# Patient Record
Sex: FEMALE | Race: BLACK OR AFRICAN AMERICAN | Employment: UNEMPLOYED | URBAN - METROPOLITAN AREA
[De-identification: names, ages, dates, MRNs, and addresses within clinical notes are randomized per-mention and may not be internally consistent; named-entity substitution may affect disease eponyms.]

---

## 2017-10-11 ENCOUNTER — HOSPITAL ENCOUNTER (EMERGENCY)
Age: 52
Discharge: HOME | End: 2017-10-11
Attending: EMERGENCY MEDICINE
Payer: SELF-PAY

## 2017-10-11 VITALS
RESPIRATION RATE: 16 BRPM | DIASTOLIC BLOOD PRESSURE: 78 MMHG | HEIGHT: 72 IN | HEART RATE: 70 BPM | OXYGEN SATURATION: 95 % | TEMPERATURE: 98.5 F | SYSTOLIC BLOOD PRESSURE: 142 MMHG

## 2017-10-11 DIAGNOSIS — F41.1 ANXIETY STATE: ICD-10-CM

## 2017-10-11 DIAGNOSIS — R42 DIZZINESS: Primary | ICD-10-CM

## 2017-10-11 DIAGNOSIS — Z91.14 NONCOMPLIANCE WITH MEDICATION TREATMENT DUE TO INTERMITTENT USE OF MEDICATION: ICD-10-CM

## 2017-10-11 PROCEDURE — 99284 EMERGENCY DEPT VISIT MOD MDM: CPT

## 2017-10-11 NOTE — DISCHARGE INSTRUCTIONS

## 2017-10-11 NOTE — ED TRIAGE NOTES
Pt arrived via EMS with c/o dizzy and off the balance but never fell or passed out. pt sts\"I want to check out,I am stressed,I looking for disability as I have bipolar,I feel I am in menopause  its make my  Face swelling. \"As per EMS report pt non-compliance with her psyche meds. Pt alert oriented,answered all questions correctly.

## 2017-10-11 NOTE — ED PROVIDER NOTES
[address] [facility]  EMERGENCY DEPARTMENT HISTORY AND PHYSICAL EXAM         Date of Service: 10/11/2017   Patient Name: Radha Ma   YOB: 1965  Medical Record Number: [id number]    History of Presenting Illness     Chief Complaint   Patient presents with    Dizziness    Anxiety        History Provided By:  patient    Additional History:   Radha Ma is a 50 y.o. female with PMhx significant for hypertension, anxiety, and bipolar disorder who presents via EMS to the ED with cc of intermittent dizziness over the past couple days. Pt states the dizziness has subsided at present. She reports coming to the ED 5 months ago for similar dizziness and had a negative workup at that time. Pt adds that she is non-compliant with her Lithium medication and has not taken it in a week. Pt also notes ongoing anxiety related to menopause. She denies any chest pain, shortness of breath, weakness, numbness. Social Hx: + Tobacco, - EtOH, - Illicit Drugs    There are no other complaints, changes or physical findings at this time. Primary Care Provider: None     Past History     Past Medical History:   Past Medical History:   Diagnosis Date    Psychiatric disorder     anxiety,bipolar        Past Surgical History:   History reviewed. No pertinent surgical history. Family History:   History reviewed. No pertinent family history. Social History:   Social History   Substance Use Topics    Smoking status: Heavy Tobacco Smoker    Smokeless tobacco: Former User    Alcohol use No        Allergies:   No Known Allergies     Review of Systems   Review of Systems   Constitutional: Negative for chills and fever. HENT: Negative for congestion, rhinorrhea, sneezing and sore throat. Eyes: Negative for redness and visual disturbance. Respiratory: Negative for shortness of breath. Cardiovascular: Negative for chest pain and leg swelling.    Gastrointestinal: Negative for abdominal pain, nausea and vomiting. Genitourinary: Negative for difficulty urinating and frequency. Musculoskeletal: Negative for back pain, myalgias and neck pain. Skin: Negative for rash. Neurological: Positive for dizziness. Negative for syncope, weakness and headaches. Hematological: Negative for adenopathy. Psychiatric/Behavioral: The patient is nervous/anxious. All other systems reviewed and are negative. Physical Exam  Physical Exam   Constitutional: She is oriented to person, place, and time. She appears well-developed and well-nourished. HENT:   Head: Normocephalic. Mouth/Throat: Oropharynx is clear and moist.   Eyes: Conjunctivae and EOM are normal. Pupils are equal, round, and reactive to light. Neck: Normal range of motion. Neck supple. Cardiovascular: Normal rate, regular rhythm, normal heart sounds and intact distal pulses. Pulmonary/Chest: Effort normal and breath sounds normal.   Abdominal: Soft. Bowel sounds are normal. She exhibits no distension. There is no rebound. Musculoskeletal: Normal range of motion. She exhibits no edema or deformity. Neurological: She is alert and oriented to person, place, and time. Skin: Skin is warm and dry. Psychiatric: She has a normal mood and affect. Her behavior is normal. Judgment and thought content normal.   Nursing note and vitals reviewed. Medical Decision Making   I am the first provider for this patient. I reviewed the vital signs, available nursing notes, past medical history, past surgical history, family history and social history. Provider Notes: DDx: anxiety, vertigo, transient hypotension     ED Course:  2:56 PM   Initial assessment performed. The patients presenting problems have been discussed, and they are in agreement with the care plan formulated and outlined with them. I have encouraged them to ask questions as they arise throughout their visit.       Diagnostic Study Results     Vital Signs-Reviewed the patient's vital signs. Patient Vitals for the past 12 hrs:   Temp Pulse Resp BP SpO2   10/11/17 1455 98.5 °F (36.9 °C) 70 16 142/78 95 %       Medications Given in the ED:  Medications - No data to display    Diagnosis:  Clinical Impression:   1. Dizziness    2. Anxiety state    3. Noncompliance with medication treatment due to intermittent use of medication         Plan:  1:   Follow-up Information     Follow up With Details Comments Contact Info    None   None ([phone]) Patient stated that they have no PCP      [facility] EMERGENCY DEPT  As needed, If symptoms worsen [address] [facility]  [phone]          2: There are no discharge medications for this patient. Return to ED if worse. Disposition:    DISCHARGE NOTE  3:09 PM  The patient has been re-evaluated and is ready for discharge. Reviewed available results with patient. Counseled pt on diagnosis and care plan. Pt has expressed understanding, and all questions have been answered. Pt agrees with plan and agrees to follow up as recommended, or return to the ED if their symptoms worsen. Discharge instructions have been provided and explained to the pt, along with reasons to return to the ED. Attestations: This note is prepared by Mitzy Colmenares. Shlomo Momin, acting as Scribe for Rafael Valle MD.    Rafael Valle MD: The scribe's documentation has been prepared under my direction and personally reviewed by me in its entirety. I confirm that the note above accurately reflects all work, treatment, procedures, and medical decision making performed by me.

## 2017-10-11 NOTE — ED NOTES
Emergency Department Nursing Plan of Care       The Nursing Plan of Care is developed from the Nursing assessment and Emergency Department Attending provider initial evaluation. The plan of care may be reviewed in the ED Provider note.     The Plan of Care was developed with the following considerations:   Patient / Family readiness to learn indicated by:verbalized understanding  Persons(s) to be included in education: patient  Barriers to Learning/Limitations:No    Signed     Tree Markham RN    10/11/2017   3:12 PM

## 2018-01-15 ENCOUNTER — HOSPITAL ENCOUNTER (EMERGENCY)
Age: 53
Discharge: HOME | End: 2018-01-15
Attending: EMERGENCY MEDICINE | Admitting: EMERGENCY MEDICINE
Payer: SUBSIDIZED

## 2018-01-15 VITALS
SYSTOLIC BLOOD PRESSURE: 132 MMHG | OXYGEN SATURATION: 100 % | BODY MASS INDEX: 27.09 KG/M2 | WEIGHT: 200 LBS | RESPIRATION RATE: 19 BRPM | HEART RATE: 80 BPM | DIASTOLIC BLOOD PRESSURE: 67 MMHG | HEIGHT: 72 IN | TEMPERATURE: 98.6 F

## 2018-01-15 DIAGNOSIS — F31.60 BIPOLAR AFFECTIVE DISORDER, CURRENT EPISODE MIXED, CURRENT EPISODE SEVERITY UNSPECIFIED (HCC): ICD-10-CM

## 2018-01-15 DIAGNOSIS — I10 ESSENTIAL HYPERTENSION: Primary | ICD-10-CM

## 2018-01-15 DIAGNOSIS — R09.89 THROAT TIGHTNESS: ICD-10-CM

## 2018-01-15 DIAGNOSIS — Z91.14 NONCOMPLIANCE WITH MEDICATIONS: ICD-10-CM

## 2018-01-15 PROCEDURE — 90791 PSYCH DIAGNOSTIC EVALUATION: CPT

## 2018-01-15 PROCEDURE — 99284 EMERGENCY DEPT VISIT MOD MDM: CPT

## 2018-01-15 PROCEDURE — 74011250637 HC RX REV CODE- 250/637: Performed by: EMERGENCY MEDICINE

## 2018-01-15 RX ORDER — LISINOPRIL 5 MG/1
10 TABLET ORAL
Status: COMPLETED | OUTPATIENT
Start: 2018-01-15 | End: 2018-01-15

## 2018-01-15 RX ADMIN — LISINOPRIL 10 MG: 5 TABLET ORAL at 09:23

## 2018-01-15 NOTE — BSMART NOTE
Discussed patient with Dr. Shira Hightower. Patient is not suicidal or homicidal and he does not feel that patient would be admitted. Called [facility] and [facility] but patient is not open. [facility] is currently closed due to the holiday but patient reports this is her primary care and where she gets her medication. Dr. Shira Hightower does not feel patient needs to be seen at this time and he will refer patient back to [facility] and will also inform patient of walk-in services at [facility] and [facility].     Julianne Ibarra [facility]
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
## 2018-01-15 NOTE — DISCHARGE INSTRUCTIONS
Bipolar Disorder: Care Instructions  Your Care Instructions    Bipolar disorder is an illness that causes extreme mood changes, from times of very high energy (manic episodes) to times of depression. But many people with bipolar disorder show only the symptoms of depression. These moods may cause problems with your work, school, family life, friendships, and how well you function. This disease is also called manic-depression. There is no cure for bipolar disorder, but it can be helped with medicines. Counseling may also help. It is important to take your medicines exactly as prescribed, even when you feel well. You may need lifelong treatment. Follow-up care is a key part of your treatment and safety. Be sure to make and go to all appointments, and call your doctor if you are having problems. It's also a good idea to know your test results and keep a list of the medicines you take. How can you care for yourself at home? · Be safe with medicines. Take your medicines exactly as prescribed. Do not stop or change a medicine without talking to your doctor first. Tabitha Paige and your doctor may need to try different combinations of medicines to find what works for you. · Take your medicines on schedule to keep your moods even. When you feel good, you may think that you do not need your medicines. But it is important to keep taking them. · Go to your counseling sessions. Call and talk with your counselor if you can't go to a session or if you don't think the sessions are helping. Do not just stop going. · Get at least 30 minutes of activity on most days of the week. Walking is a good choice. You also may want to do other things, such as running, swimming, or cycling. · Get enough sleep. Keep your room dark and quiet. Try to go to bed at the same time every night. · Eat a healthy diet. This includes whole grains, dairy, fruits, vegetables, and protein. Eat foods from each of these groups. · Try to lower your stress. Manage your time, build a strong support system, and lead a healthy lifestyle. To lower your stress, try physical activity, slow deep breathing, or getting a massage. · Do not use alcohol or illegal drugs. · Learn the early signs of your mood changes. You can then take steps to help yourself feel better. · Ask for help from friends and family when you need it. You may need help with daily chores when you are depressed. When you are manic, you may need support to control your high energy levels. What should you do if someone in your family has bipolar disorder? · Learn about the disease and the signs that it is getting worse. · Remind your family member that you love him or her. · Make a plan with all family members about how to take care of your loved one when his or her symptoms are bad. · Talk about your fears and concerns and those of other family members. Seek counseling if needed. · Do not focus attention only on the person who is in treatment. · Remind yourself that it will take time for changes to occur. · Do not blame yourself for the disease. · Know your legal rights and the legal rights of your family member. Support groups or counselors can help you with this information. · Take care of yourself. Keep up with your own interests, such as your career, hobbies, and friends. Use exercise, positive self-talk, deep breathing, and other relaxing exercises to help lower your stress. · Give yourself time to grieve. You may need to deal with emotions such as anger, fear, and frustration. After you work through your feelings, you will be better able to care for yourself and your family. · If you are having a hard time with your feelings or with your relationship with your family member, talk with a counselor. When should you call for help? Call 911 anytime you think you may need emergency care. For example, call if:  ? · You feel like hurting yourself or someone else.    ? · Someone who has bipolar disorder displays dangerous behavior, and you think the person might hurt himself or herself or someone else. ?Call your doctor now or seek immediate medical care if:  ? · You hear voices. ? · Someone you know has bipolar disorder and talks about suicide. Keep the numbers for these national suicide hotlines: 0-701-411-TALK (2-519-562-007-705-6115) and 6-800-WBNKFCF (0-235.834.8979). If a suicide threat seems real, with a specific plan and a way to carry it out, stay with the person, or ask someone you trust to stay with the person, until you can get help. ? · Someone you know has bipolar disorder and:  ¨ Starts to give away possessions. ¨ Is using illegal drugs or drinking alcohol heavily. ¨ Talks or writes about death, including writing suicide notes or talking about guns, knives, or pills. ¨ Talks or writes about hurting someone else. ¨ Starts to spend a lot of time alone. ¨ Acts very aggressively or suddenly appears calm. ¨ Talks about beliefs that are not based in reality (delusions). ? Watch closely for changes in your health, and be sure to contact your doctor if:  ? · You cannot go to your counseling sessions. Where can you learn more? Go to http://ernie-nona.info/. Enter K052 in the search box to learn more about \"Bipolar Disorder: Care Instructions. \"  Current as of: May 12, 2017  Content Version: 11.4  © 1029-8461 [employer]. Care instructions adapted under license by [employer] (which disclaims liability or warranty for this information). If you have questions about a medical condition or this instruction, always ask your healthcare professional. [place] [address] any warranty or liability for your use of this information. High Blood Pressure: Care Instructions  Your Care Instructions    If your blood pressure is usually above 140/90, you have high blood pressure, or hypertension.  That means the top number is 140 or higher or the bottom number is 90 or higher, or both. Despite what a lot of people think, high blood pressure usually doesn't cause headaches or make you feel dizzy or lightheaded. It usually has no symptoms. But it does increase your risk for heart attack, stroke, and kidney or eye damage. The higher your blood pressure, the more your risk increases. Your doctor will give you a goal for your blood pressure. Your goal will be based on your health and your age. An example of a goal is to keep your blood pressure below 140/90. Lifestyle changes, such as eating healthy and being active, are always important to help lower blood pressure. You might also take medicine to reach your blood pressure goal.  Follow-up care is a key part of your treatment and safety. Be sure to make and go to all appointments, and call your doctor if you are having problems. It's also a good idea to know your test results and keep a list of the medicines you take. How can you care for yourself at home? Medical treatment  · If you stop taking your medicine, your blood pressure will go back up. You may take one or more types of medicine to lower your blood pressure. Be safe with medicines. Take your medicine exactly as prescribed. Call your doctor if you think you are having a problem with your medicine. · Talk to your doctor before you start taking aspirin every day. Aspirin can help certain people lower their risk of a heart attack or stroke. But taking aspirin isn't right for everyone, because it can cause serious bleeding. · See your doctor regularly. You may need to see the doctor more often at first or until your blood pressure comes down. · If you are taking blood pressure medicine, talk to your doctor before you take decongestants or anti-inflammatory medicine, such as ibuprofen. Some of these medicines can raise blood pressure. · Learn how to check your blood pressure at home. Lifestyle changes  · Stay at a healthy weight.  This is especially important if you put on weight around the waist. Losing even 10 pounds can help you lower your blood pressure. · If your doctor recommends it, get more exercise. Walking is a good choice. Bit by bit, increase the amount you walk every day. Try for at least 30 minutes on most days of the week. You also may want to swim, bike, or do other activities. · Avoid or limit alcohol. Talk to your doctor about whether you can drink any alcohol. · Try to limit how much sodium you eat to less than 2,300 milligrams (mg) a day. Your doctor may ask you to try to eat less than 1,500 mg a day. · Eat plenty of fruits (such as bananas and oranges), vegetables, legumes, whole grains, and low-fat dairy products. · Lower the amount of saturated fat in your diet. Saturated fat is found in animal products such as milk, cheese, and meat. Limiting these foods may help you lose weight and also lower your risk for heart disease. · Do not smoke. Smoking increases your risk for heart attack and stroke. If you need help quitting, talk to your doctor about stop-smoking programs and medicines. These can increase your chances of quitting for good. When should you call for help? Call 911 anytime you think you may need emergency care. This may mean having symptoms that suggest that your blood pressure is causing a serious heart or blood vessel problem. Your blood pressure may be over 180/110. ? For example, call 911 if:  ? · You have symptoms of a heart attack. These may include:  ¨ Chest pain or pressure, or a strange feeling in the chest.  ¨ Sweating. ¨ Shortness of breath. ¨ Nausea or vomiting. ¨ Pain, pressure, or a strange feeling in the back, neck, jaw, or upper belly or in one or both shoulders or arms. ¨ Lightheadedness or sudden weakness. ¨ A fast or irregular heartbeat. ? · You have symptoms of a stroke.  These may include:  ¨ Sudden numbness, tingling, weakness, or loss of movement in your face, arm, or leg, especially on only one side of your body. ¨ Sudden vision changes. ¨ Sudden trouble speaking. ¨ Sudden confusion or trouble understanding simple statements. ¨ Sudden problems with walking or balance. ¨ A sudden, severe headache that is different from past headaches. ? · You have severe back or belly pain. ?Do not wait until your blood pressure comes down on its own. Get help right away. ?Call your doctor now or seek immediate care if:  ? · Your blood pressure is much higher than normal (such as 180/110 or higher), but you don't have symptoms. ? · You think high blood pressure is causing symptoms, such as:  ¨ Severe headache. ¨ Blurry vision. ? Watch closely for changes in your health, and be sure to contact your doctor if:  ? · Your blood pressure measures 140/90 or higher at least 2 times. That means the top number is 140 or higher or the bottom number is 90 or higher, or both. ? · You think you may be having side effects from your blood pressure medicine. ? · Your blood pressure is usually normal, but it goes above normal at least 2 times. Where can you learn more? Go to http://ernie-nona.info/. Enter P338 in the search box to learn more about \"High Blood Pressure: Care Instructions. \"  Current as of: September 21, 2016  Content Version: 11.4  © 4914-1157 [employer]. Care instructions adapted under license by [employer] (which disclaims liability or warranty for this information). If you have questions about a medical condition or this instruction, always ask your healthcare professional. [place] [address] any warranty or liability for your use of this information.

## 2018-01-17 ENCOUNTER — HOSPITAL ENCOUNTER (INPATIENT)
Age: 53
LOS: 12 days | Discharge: HOME | DRG: 885 | End: 2018-01-29
Attending: EMERGENCY MEDICINE | Admitting: PSYCHIATRY & NEUROLOGY
Payer: SUBSIDIZED

## 2018-01-17 DIAGNOSIS — F31.9 BIPOLAR 1 DISORDER (HCC): Primary | ICD-10-CM

## 2018-01-17 LAB
ALBUMIN SERPL-MCNC: 3.7 G/DL (ref 3.5–5)
ALBUMIN/GLOB SERPL: 0.8 {RATIO} (ref 1.1–2.2)
ALP SERPL-CCNC: 83 U/L (ref 45–117)
ALT SERPL-CCNC: 42 U/L (ref 12–78)
AMPHET UR QL SCN: NEGATIVE
ANION GAP SERPL CALC-SCNC: 9 MMOL/L (ref 5–15)
APPEARANCE UR: ABNORMAL
AST SERPL-CCNC: 125 U/L (ref 15–37)
BACTERIA URNS QL MICRO: NEGATIVE /HPF
BARBITURATES UR QL SCN: NEGATIVE
BASOPHILS # BLD: 0 K/UL (ref 0–0.1)
BASOPHILS NFR BLD: 0 % (ref 0–1)
BENZODIAZ UR QL: NEGATIVE
BILIRUB SERPL-MCNC: 0.6 MG/DL (ref 0.2–1)
BILIRUB UR QL CFM: NEGATIVE
BUN SERPL-MCNC: 43 MG/DL (ref 6–20)
BUN/CREAT SERPL: 32 (ref 12–20)
CALCIUM SERPL-MCNC: 9.1 MG/DL (ref 8.5–10.1)
CANNABINOIDS UR QL SCN: NEGATIVE
CHLORIDE SERPL-SCNC: 100 MMOL/L (ref 97–108)
CO2 SERPL-SCNC: 26 MMOL/L (ref 21–32)
COCAINE UR QL SCN: NEGATIVE
COLOR UR: ABNORMAL
CREAT SERPL-MCNC: 1.33 MG/DL (ref 0.55–1.02)
DATE LAST DOSE: ABNORMAL
DRUG SCRN COMMENT,DRGCM: NORMAL
EOSINOPHIL # BLD: 0 K/UL (ref 0–0.4)
EOSINOPHIL NFR BLD: 0 % (ref 0–7)
EPITH CASTS URNS QL MICRO: ABNORMAL /LPF
ERYTHROCYTE [DISTWIDTH] IN BLOOD BY AUTOMATED COUNT: 13.8 % (ref 11.5–14.5)
ETHANOL SERPL-MCNC: <10 MG/DL
GLOBULIN SER CALC-MCNC: 4.5 G/DL (ref 2–4)
GLUCOSE SERPL-MCNC: 96 MG/DL (ref 65–100)
GLUCOSE UR STRIP.AUTO-MCNC: NEGATIVE MG/DL
HCG UR QL: NEGATIVE
HCT VFR BLD AUTO: 34.7 % (ref 35–47)
HGB BLD-MCNC: 11.3 G/DL (ref 11.5–16)
HGB UR QL STRIP: ABNORMAL
HYALINE CASTS URNS QL MICRO: ABNORMAL /LPF (ref 0–5)
KETONES UR QL STRIP.AUTO: ABNORMAL MG/DL
LEUKOCYTE ESTERASE UR QL STRIP.AUTO: ABNORMAL
LITHIUM SERPL-SCNC: <0.2 MMOL/L (ref 0.6–1.2)
LYMPHOCYTES # BLD: 1.7 K/UL (ref 0.8–3.5)
LYMPHOCYTES NFR BLD: 21 % (ref 12–49)
MCH RBC QN AUTO: 27.8 PG (ref 26–34)
MCHC RBC AUTO-ENTMCNC: 32.6 G/DL (ref 30–36.5)
MCV RBC AUTO: 85.5 FL (ref 80–99)
METHADONE UR QL: NEGATIVE
MONOCYTES # BLD: 0.6 K/UL (ref 0–1)
MONOCYTES NFR BLD: 7 % (ref 5–13)
NEUTS SEG # BLD: 5.9 K/UL (ref 1.8–8)
NEUTS SEG NFR BLD: 72 % (ref 32–75)
NITRITE UR QL STRIP.AUTO: NEGATIVE
OPIATES UR QL: NEGATIVE
PCP UR QL: NEGATIVE
PH UR STRIP: 5 [PH] (ref 5–8)
PLATELET # BLD AUTO: 366 K/UL (ref 150–400)
POTASSIUM SERPL-SCNC: 3.6 MMOL/L (ref 3.5–5.1)
PROT SERPL-MCNC: 8.2 G/DL (ref 6.4–8.2)
PROT UR STRIP-MCNC: ABNORMAL MG/DL
RBC # BLD AUTO: 4.06 M/UL (ref 3.8–5.2)
RBC #/AREA URNS HPF: >100 /HPF (ref 0–5)
REPORTED DOSE,DOSE: ABNORMAL UNITS
REPORTED DOSE/TIME,TMG: ABNORMAL
SODIUM SERPL-SCNC: 135 MMOL/L (ref 136–145)
SP GR UR REFRACTOMETRY: 1.02 (ref 1–1.03)
TSH SERPL DL<=0.05 MIU/L-ACNC: 1.03 UIU/ML (ref 0.36–3.74)
UR CULT HOLD, URHOLD: NORMAL
UROBILINOGEN UR QL STRIP.AUTO: 1 EU/DL (ref 0.2–1)
WBC # BLD AUTO: 8.2 K/UL (ref 3.6–11)
WBC URNS QL MICRO: ABNORMAL /HPF (ref 0–4)

## 2018-01-17 PROCEDURE — 90791 PSYCH DIAGNOSTIC EVALUATION: CPT

## 2018-01-17 PROCEDURE — 84443 ASSAY THYROID STIM HORMONE: CPT | Performed by: PSYCHIATRY & NEUROLOGY

## 2018-01-17 PROCEDURE — 80307 DRUG TEST PRSMV CHEM ANLYZR: CPT | Performed by: EMERGENCY MEDICINE

## 2018-01-17 PROCEDURE — 36415 COLL VENOUS BLD VENIPUNCTURE: CPT | Performed by: EMERGENCY MEDICINE

## 2018-01-17 PROCEDURE — 99284 EMERGENCY DEPT VISIT MOD MDM: CPT

## 2018-01-17 PROCEDURE — 65220000003 HC RM SEMIPRIVATE PSYCH

## 2018-01-17 PROCEDURE — 81001 URINALYSIS AUTO W/SCOPE: CPT | Performed by: STUDENT IN AN ORGANIZED HEALTH CARE EDUCATION/TRAINING PROGRAM

## 2018-01-17 PROCEDURE — 80178 ASSAY OF LITHIUM: CPT | Performed by: EMERGENCY MEDICINE

## 2018-01-17 PROCEDURE — 81025 URINE PREGNANCY TEST: CPT | Performed by: EMERGENCY MEDICINE

## 2018-01-17 PROCEDURE — 85025 COMPLETE CBC W/AUTO DIFF WBC: CPT | Performed by: EMERGENCY MEDICINE

## 2018-01-17 PROCEDURE — 80053 COMPREHEN METABOLIC PANEL: CPT | Performed by: EMERGENCY MEDICINE

## 2018-01-17 RX ORDER — ZOLPIDEM TARTRATE 5 MG/1
5 TABLET ORAL
Status: DISCONTINUED | OUTPATIENT
Start: 2018-01-17 | End: 2018-01-19

## 2018-01-17 RX ORDER — ADHESIVE BANDAGE
30 BANDAGE TOPICAL DAILY PRN
Status: DISCONTINUED | OUTPATIENT
Start: 2018-01-17 | End: 2018-01-29 | Stop reason: HOSPADM

## 2018-01-17 RX ORDER — LORAZEPAM 1 MG/1
1 TABLET ORAL
Status: DISCONTINUED | OUTPATIENT
Start: 2018-01-17 | End: 2018-01-29 | Stop reason: HOSPADM

## 2018-01-17 RX ORDER — LORAZEPAM 2 MG/ML
2 INJECTION INTRAMUSCULAR
Status: DISCONTINUED | OUTPATIENT
Start: 2018-01-17 | End: 2018-01-29 | Stop reason: HOSPADM

## 2018-01-17 RX ORDER — OLANZAPINE 5 MG/1
5 TABLET ORAL
Status: DISCONTINUED | OUTPATIENT
Start: 2018-01-17 | End: 2018-01-29 | Stop reason: HOSPADM

## 2018-01-17 RX ORDER — LISINOPRIL 5 MG/1
5 TABLET ORAL DAILY
COMMUNITY
End: 2018-01-17

## 2018-01-17 RX ORDER — ACETAMINOPHEN 325 MG/1
650 TABLET ORAL
Status: DISCONTINUED | OUTPATIENT
Start: 2018-01-17 | End: 2018-01-29 | Stop reason: HOSPADM

## 2018-01-17 RX ORDER — THERA TABS 400 MCG
1 TAB ORAL DAILY
COMMUNITY

## 2018-01-17 RX ORDER — BENZTROPINE MESYLATE 1 MG/ML
2 INJECTION INTRAMUSCULAR; INTRAVENOUS
Status: DISCONTINUED | OUTPATIENT
Start: 2018-01-17 | End: 2018-01-29 | Stop reason: HOSPADM

## 2018-01-17 RX ORDER — LISINOPRIL AND HYDROCHLOROTHIAZIDE 20; 25 MG/1; MG/1
1 TABLET ORAL DAILY
Status: ON HOLD | COMMUNITY
End: 2018-01-29

## 2018-01-17 RX ORDER — BENZTROPINE MESYLATE 2 MG/1
2 TABLET ORAL
Status: DISCONTINUED | OUTPATIENT
Start: 2018-01-17 | End: 2018-01-29 | Stop reason: HOSPADM

## 2018-01-17 RX ORDER — IBUPROFEN 200 MG
1 TABLET ORAL
Status: DISCONTINUED | OUTPATIENT
Start: 2018-01-17 | End: 2018-01-29 | Stop reason: HOSPADM

## 2018-01-17 RX ORDER — ZOLPIDEM TARTRATE 10 MG/1
10 TABLET ORAL
Status: DISCONTINUED | OUTPATIENT
Start: 2018-01-17 | End: 2018-01-17

## 2018-01-17 RX ORDER — IBUPROFEN 400 MG/1
400 TABLET ORAL
Status: DISCONTINUED | OUTPATIENT
Start: 2018-01-17 | End: 2018-01-29 | Stop reason: HOSPADM

## 2018-01-17 NOTE — BSMART NOTE
Comprehensive Assessment Form Part 1    Section I - Disposition    Axis I - Bipolar Disorder with Psychotic Features   Axis II - Deferred  Axis III - Hypertension  Axis IV - Lack of social support system  [place] V - 30      The Medical Doctor to Psychiatrist conference was not completed. The Medical Doctor is in agreement with Psychiatrist disposition because of (reason) patient is willing to be admitted voluntarily. The plan is admit to [facility]. The on-call Psychiatrist consulted was Dr. Guillermina Connell. The admitting Psychiatrist will be Dr. Guillermina Connell. The admitting Diagnosis is Bipolar Disorder. The Payor source is self. Section II - Integrated Summary  Summary:  Patient is a 50year old female seen face to face in the ER. She was brought into the ER by 2 mental health workers that were contacted by patient's apartment complex after she displayed concerning behaviors. [facility], has a relationship with Tali Love. Yesterday patient came to the rental office and turned in the keys to her apartment to management explaining she did not feel safe there and was choosing to be homeless. Management called the police for a wellness check but police were unable to find her. Patient reports that she was spent the night walking down [facility] and then came back to her apartment complex rental office this morning. Staff at the rental office called [phone] to see her and they recommended she come to [facility] to be assessed. Patient is extremely paranoid and is reporting several persecutory delusions. She reports that people are following her when she is alone in the apartment. She also reported 2 people came to her door and tazed her after interrogating her. She believes people are investigating her \"for no reason. \"  She does not feel safe at her home and is not sleeping.   She denied suicidal or homicidal ideations. She also denied a history of psychiatric hospitalizations. She reports she was treated for bipolar disorder with lithium by Dr. Ramiro Lopez at the [facility], however she stopped taking the medication because she can't afford it. She lost her Medicaid at some point and was turned down for disability. She has no income other than food stamps and a monthly check for $94 that she uses to buy toiletries and pay her electric bill. She denied experiencing auditory hallucinations, but said she threw her cell phone away a few weeks ago because she was using it and heard a voice asking about her. She believes it was her phone being tapped. She currently has no support system in place, but the counselors at [facility] reported they would like to continue working with her when she is discharged. She is willing to be admitted voluntarily. The patient has demonstrated mental capacity to provide informed consent. The information is given by the patient, past medical records and counselor from [facility]. The Chief Complaint is mental health problem. The Precipitant Factors are no access to treatment, poor insight. Previous Hospitalizations: denies  The patient has not previously been in restraints. Current Psychiatrist and/or  is NA. Lethality Assessment:    The potential for suicide is not noted. The potential for homicide is not noted. The patient has not been a perpetrator of sexual or physical abuse. There are not pending charges. The patient is not felt to be at risk for self harm or harm to others. The attending nurse was advised that security has not been notified. Section III - Psychosocial  The patient's overall mood and attitude is suspicious. Feelings of helplessness and hopelessness are observed by patient's expressed feelings of not feeling safe. Generalized anxiety is not observed. Panic is not observed. Phobias are not observed. Obsessive compulsive tendencies are not observed. Section IV - Mental Status Exam  The patient's appearance shows no evidence of impairment. The patient's behavior is guarded. The patient is oriented to time, place, person and situation. The patient's speech shows no evidence of impairment. The patient's mood is withdrawn. The range of affect is constricted. The patient's thought content demonstrates delusions and paranoia. The thought process perseveration. The patient's perception demonstrated changes in the following:  auditory hallucinations. The patient's memory shows no evidence of impairment. The patient's appetite shows no evidence of impairment. The patient's sleep has evidence of insomnia. The patient shows little insight. The patient's judgement is psychologically impaired. Section V - Substance Abuse  The patient is not using substances. Section VI - Living Arrangements  The patient is single. The patient lives alone. The patient has one adult child. The patient does plan to return home upon discharge. The patient does not have legal issues pending. The patient's source of income comes from government assistance. Taoist and cultural practices have been noted and include: Latter day. The patient's greatest support comes from nobody and this person will not be involved with the treatment. The patient has not been in an event described as horrible or outside the realm of ordinary life experience either currently or in the past.  The patient has not been a victim of sexual/physical abuse. Section VII - Other Areas of Clinical Concern  The highest grade achieved is unknown with the overall quality of school experience being described as unknown. The patient is currently unemployed and speaks [place] as a primary language. The patient has no communication impairments affecting communication.  The patient's preference for learning can be described as: can read and write adequately.   The patient's hearing is normal.  The patient's vision is normal.      [place]

## 2018-01-17 NOTE — IP AVS SNAPSHOT
[address] [address] 
[phone] Patient: Anne Martinez MRN: [id number] :1965 About your hospitalization You were admitted on:  2018 You last received care in the:  [facility] You were discharged on:  2018 Why you were hospitalized Your primary diagnosis was:  Bipolar 1 Disorder (Hcc) Follow-up Information Follow up With Details Comments Contact Info Haloperidol Deconaote BHATIA  On 2018  Haloperidol 100mg IM every 28 days None   None ([phone]) Patient stated that they have no PCP [address] On 2018 @ 9am. Bring identification and rental lease [employer] [facility] [address] [place], [address] [place] [address] 
[phone] Discharge Orders None A check natalya indicates which time of day the medication should be taken. My Medications START taking these medications Instructions Each Dose to Equal  
 Morning Noon Evening Bedtime  
 divalproex  mg ER tablet Commonly known as:  DEPAKOTE ER Your next dose is: Tonight 9 pm  
   
 Take 2 Tabs by mouth nightly. Indications: MIXED BIPOLAR I DISORDER  
 1000 mg  
    
   
   
   
  
  
 haloperidol 10 mg tablet Commonly known as:  HALDOL Your next dose is: Tonight at 9 pm   
   
 Take 1 Tab by mouth nightly. Indications: Psychotic Disorder 10 mg  
    
   
   
   
  
  
 haloperidol decanoate 100 mg/mL injection Commonly known as:  HALDOL DECANOATE Your last dose was: Today. Notes to Patient:  Follow up out patient. Take as prescribed. 1 mL by IntraMUSCular route every twenty-eight (28) days. Indications: Schizophrenia  
 100 mg  
    
   
   
   
  
 lithium carbonate  mg CR tablet Commonly known as:  ESKALITH CR Your next dose is: Tonight 9 pm  
   
 Take 2 Tabs by mouth nightly. Indications: Bipolar Disorder  900 mg  
    
 CONTINUE taking these medications Instructions Each Dose to Equal  
 Morning Noon Evening Bedtime  
 fish oil-omega-3 fatty acids 340-1,000 mg capsule Your next dose is:  Tomorrow 9 am   
   
 Take 1 Cap by mouth daily. 1 Cap  
    
  
   
   
   
  
 lisinopril-hydroCHLOROthiazide 20-25 mg per tablet Commonly known as:  Joel Odonnell Your next dose is:  Tomorrow 9 am  
   
 Take 1 Tab by mouth daily. Indications: hypertension 1 Tab  
    
  
   
   
   
  
 therapeutic multivitamin tablet Commonly known as:  [facility] Notes to Patient:  Take as prescribed Take 1 Tab by mouth daily. 1 Tab Where to Get Your Medications Information on where to get these meds will be given to you by the nurse or doctor. ! Ask your nurse or doctor about these medications  
  divalproex  mg ER tablet  
 haloperidol 10 mg tablet  
 haloperidol decanoate 100 mg/mL injection  
 lisinopril-hydroCHLOROthiazide 20-25 mg per tablet  
 lithium carbonate  mg CR tablet Discharge Instructions DISCHARGE SUMMARY 
 
Noe Hunter : 1965 MRN: [id number] The patient Romana Stacy exhibits the ability to control behavior in a less restrictive environment. Patient's level of functioning is improving. No assaultive/destructive behavior has been observed for the past 24 hours. No suicidal/homicidal threat or behavior has been observed for the past 24 hours. There is no evidence of serious medication side effects. Patient has not been in physical or protective restraints for at least the past 24 hours. If weapons involved, how are they secured? N/A Is patient aware of and in agreement with discharge plan? Yes Arrangements for medication:  Two weeks of medication given to patient. Referral for substance abuse treatment ? N/A Referral for smoking cessation needed ?  N/A 
 
 Copy of discharge instructions to  provider?:  Yes. Fax: Yulia Anderson 436) Arrangements for transportation home:  Family to [email or web address] Keep all follow up appointments as scheduled, continue to take prescribed medications per physician instructions. Mental health crisis number:  [phone] or your local mental health crisis line number at [phone] DISCHARGE SUMMARY from Nurse PATIENT INSTRUCTIONS: 
What to do at Home: 
Recommended activity: Activity as tolerated. If you experience any of the following symptoms thoughts of harming self or others, increased overwhelming fear, overwhelming racing thoughts,  please call 911 or your local crisis Mental Health number [phone]. *  Please give a list of your current medications to your Primary Care Provider. *  Please update this list whenever your medications are discontinued, doses are 
    changed, or new medications (including over-the-counter products) are added. *  Please carry medication information at all times in case of emergency situations. These are general instructions for a healthy lifestyle: No smoking/ No tobacco products/ Avoid exposure to second hand smoke Surgeon General's Warning:  Quitting smoking now greatly reduces serious risk to your health. Obesity, smoking, and sedentary lifestyle greatly increases your risk for illness A healthy diet, regular physical exercise & weight monitoring are important for maintaining a healthy lifestyle You may be retaining fluid if you have a history of heart failure or if you experience any of the following symptoms:  Weight gain of 3 pounds or more overnight or 5 pounds in a week, increased swelling in our hands or feet or shortness of breath while lying flat in bed. Please call your doctor as soon as you notice any of these symptoms; do not wait until your next office visit. Recognize signs and symptoms of STROKE: 
 
F-face looks uneven A-arms unable to move or move unevenly S-speech slurred or non-existent T-time-call 911 as soon as signs and symptoms begin-DO NOT go Back to bed or wait to see if you get better-TIME IS BRAIN. Warning Signs of HEART ATTACK Call 911 if you have these symptoms: 
? Chest discomfort. Most heart attacks involve discomfort in the center of the chest that lasts more than a few minutes, or that goes away and comes back. It can feel like uncomfortable pressure, squeezing, fullness, or pain. ? Discomfort in other areas of the upper body. Symptoms can include pain or discomfort in one or both arms, the back, neck, jaw, or stomach. ? Shortness of breath with or without chest discomfort. ? Other signs may include breaking out in a cold sweat, nausea, or lightheadedness. Don't wait more than five minutes to call [address]! Fast action can save your life. Calling 911 is almost always the fastest way to get lifesaving treatment. Emergency Medical Services staff can begin treatment when they arrive  up to an hour sooner than if someone gets to the hospital by car. The discharge information has been reviewed with the patient. The patient verbalized understanding. Discharge medications reviewed with the patient and appropriate educational materials and side effects teaching were provided. ___________________________________________________________________________________________________________________________________ [employer] Announcement We are excited to announce that we are making your provider's discharge notes available to you in [employer]. You will see these notes when they are completed and signed by the physician that discharged you from your recent hospital stay.   If you have any questions or concerns about any information you see in Mun[employer], please call the Health Information Department where you were seen or reach out to your Primary Care Provider for more information about your plan of care. Introducing [facility]! Antione Smith introduces [employer] patient portal. Now you can access parts of your medical record, email your doctor's office, and request medication refills online. 1. In your internet browser, go to [email or web address] 2. Click on the First Time User? Click Here link in the Sign In box. You will see the New Member Sign Up page. 3. Enter your [employer] Access Code exactly as it appears below. You will not need to use this code after youve completed the sign-up process. If you do not sign up before the expiration date, you must request a new code. · [employer] Access Code: [email or web address] Expires: 4/15/2018  9:23 AM 
 
4. Enter the last four digits of your Social Security Number (xxxx) and Date of Birth (mm/dd/yyyy) as indicated and click Submit. You will be taken to the next sign-up page. 5. Create a [employer] ID. This will be your [employer] login ID and cannot be changed, so think of one that is secure and easy to remember. 6. Create a [employer] password. You can change your password at any time. 7. Enter your Password Reset Question and Answer. This can be used at a later time if you forget your password. 8. Enter your e-mail address. You will receive e-mail notification when new information is available in [address] 9. Click Sign Up. You can now view and download portions of your medical record. 10. Click the Download Summary menu link to download a portable copy of your medical information. If you have questions, please visit the Frequently Asked Questions section of the [employer] website. Remember, [employer] is NOT to be used for urgent needs. For medical emergencies, dial 911. Now available from your iPhone and Android! Providers Seen During Your Hospitalization Provider Specialty Primary office phone Angelica Willett MD Emergency Medicine [phone] Jules Bass MD Psychiatry [phone] Your Primary Care Physician (PCP) Primary Care Physician Office Phone Office Fax NONE ** None ** ** None ** You are allergic to the following No active allergies Recent Documentation Weight Breastfeeding? BMI Smoking Status 88.2 kg No 26.38 kg/m2 Heavy Tobacco Smoker Emergency Contacts Name Discharge Info Relation Home Work Mobile Altaf Caro [1] Father [15] [phone] Patient Belongings The following personal items are in your possession at time of discharge: 
  Dental Appliances: None  Visual Aid: None      Home Medications: None   Jewelry: None  Clothing: Footwear, Gloves, Pants, Shirt, Socks, Sweater, Undergarments (shoes, clothes w/ string in Pt locker)    Other Valuables: Priya Nguyễn (locked in med room cabinet)  Personal Items Sent to Safe: none Please provide this summary of care documentation to your next provider. Signatures-by signing, you are acknowledging that this After Visit Summary has been reviewed with you and you have received a copy. Patient Signature:  ____________________________________________________________ Date:  ____________________________________________________________  
  
Juliet Luu Provider Signature:  ____________________________________________________________ Date:  ____________________________________________________________
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
## 2018-01-17 NOTE — IP AVS SNAPSHOT
[address] [facility] [address] [place] [address] 
[phone] Patient: Corinne Norwood MRN: [id number] :1965 A check natalya indicates which time of day the medication should be taken. My Medications START taking these medications Instructions Each Dose to Equal  
 Morning Noon Evening Bedtime  
 divalproex  mg ER tablet Commonly known as:  DEPAKOTE ER Your next dose is: Tonight 9 pm  
   
 Take 2 Tabs by mouth nightly. Indications: MIXED BIPOLAR I DISORDER  
 1000 mg  
    
   
   
   
  
  
 haloperidol 10 mg tablet Commonly known as:  HALDOL Your next dose is: Tonight at 9 pm   
   
 Take 1 Tab by mouth nightly. Indications: Psychotic Disorder 10 mg  
    
   
   
   
  
  
 haloperidol decanoate 100 mg/mL injection Commonly known as:  HALDOL DECANOATE Your last dose was: Today. Notes to Patient:  Follow up out patient. Take as prescribed. 1 mL by IntraMUSCular route every twenty-eight (28) days. Indications: Schizophrenia  
 100 mg  
    
   
   
   
  
 lithium carbonate  mg CR tablet Commonly known as:  ESKALITH CR Your next dose is: Tonight 9 pm  
   
 Take 2 Tabs by mouth nightly. Indications: Bipolar Disorder 900 mg CONTINUE taking these medications Instructions Each Dose to Equal  
 Morning Noon Evening Bedtime  
 fish oil-omega-3 fatty acids 340-1,000 mg capsule Your next dose is:  Tomorrow 9 am   
   
 Take 1 Cap by mouth daily. 1 Cap  
    
  
   
   
   
  
 lisinopril-hydroCHLOROthiazide 20-25 mg per tablet Commonly known as:  Hildred Saint Your next dose is:  Tomorrow 9 am  
   
 Take 1 Tab by mouth daily. Indications: hypertension 1 Tab  
    
  
   
   
   
  
 therapeutic multivitamin tablet Commonly known as:  [facility] Notes to Patient:  Take as prescribed Take 1 Tab by mouth daily. 1 Tab Where to Get Your Medications Information on where to get these meds will be given to you by the nurse or doctor. ! Ask your nurse or doctor about these medications  
  divalproex  mg ER tablet  
 haloperidol 10 mg tablet  
 haloperidol decanoate 100 mg/mL injection  
 lisinopril-hydroCHLOROthiazide 20-25 mg per tablet  
 lithium carbonate  mg CR tablet
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
## 2018-01-17 NOTE — BH NOTES
TRANSFER - IN REPORT:    Verbal report received from MARIE Barrow(name) on Maralyn Aschoff  being received from ER (unit) for routine progression of care      Report consisted of patients Situation, Background, Assessment and   Recommendations(SBAR). Information from the following report(s) SBAR was reviewed with the receiving nurse. Opportunity for questions and clarification was provided. Assessment completed upon patients arrival to unit and care assumed.

## 2018-01-17 NOTE — ROUTINE PROCESS
TRANSFER - OUT REPORT:    Verbal report given to Terri RN (name) on Ann Marie Juan  being transferred to [address] [place] [address] ([facility]) for routine progression of care       Report consisted of patients Situation, Background, Assessment and   Recommendations(SBAR). Information from the following report(s) SBAR, ED Summary and MAR was reviewed with the receiving nurse. Lines:       Opportunity for questions and clarification was provided.       Patient transported with:   Registered Nurse

## 2018-01-17 NOTE — IP AVS SNAPSHOT
Summary of Care Report The Summary of Care report has been created to help improve care coordination. Users with access to [employer] or [address] (Web-based application) may access additional patient information including the Discharge Summary. If you are not currently a [address] user and need more information, please call the number listed below in the [place] [address] section and ask to be connected with Medical Records. Facility Information Name Address Phone [address] [place] [id number] [phone] Patient Information Patient Name Sex  Joanne Cranker ([id number]) Female 1965 Discharge Information Admitting Provider Service Area Unit Ricci Salazar MD / [id number] / [phone] Discharge Provider Discharge Date/Time Discharge Disposition Destination (none) 2018 (Pending) AHR (none) Patient Language Language ENGLISH [13] Hospital Problems as of 2018  Never Reviewed Class Noted - Resolved Last Modified POA Active Problems * (Principal)Bipolar 1 disorder ([facility] [address]  2018 - Present 2018 by Milvia Florence MD Unknown Entered by Ricci Salazar MD  
  
You are allergic to the following No active allergies Current Discharge Medication List  
  
START taking these medications Dose & Instructions Dispensing Information Comments  
 divalproex  mg ER tablet Commonly known as:  DEPAKOTE ER Dose:  1000 mg Take 2 Tabs by mouth nightly. Indications: MIXED BIPOLAR I DISORDER Quantity:  60 Tab Refills:  0  
   
 haloperidol 10 mg tablet Commonly known as:  HALDOL Dose:  10 mg Take 1 Tab by mouth nightly. Indications: Psychotic Disorder Quantity:  30 Tab Refills:  0  
   
 haloperidol decanoate 100 mg/mL injection Commonly known as:  HALDOL DECANOATE Notes to Patient:  Follow up out patient. Take as prescribed. Dose:  100 mg  
1 mL by IntraMUSCular route every twenty-eight (28) days. Indications: Schizophrenia Quantity:  1 Vial  
Refills:  0  
   
 lithium carbonate  mg CR tablet Commonly known as:  ESKALITH CR Dose:  900 mg Take 2 Tabs by mouth nightly. Indications: Bipolar Disorder Quantity:  60 Tab Refills:  0 CONTINUE these medications which have NOT CHANGED Dose & Instructions Dispensing Information Comments  
 fish oil-omega-3 fatty acids 340-1,000 mg capsule Dose:  1 Cap Take 1 Cap by mouth daily. Refills:  0  
   
 lisinopril-hydroCHLOROthiazide 20-25 mg per tablet Commonly known as:  Joe Turton Dose:  1 Tab Take 1 Tab by mouth daily. Indications: hypertension Quantity:  30 Tab Refills:  0  
   
 therapeutic multivitamin tablet Commonly known as:  [facility] Notes to Patient:  Take as prescribed Dose:  1 Tab Take 1 Tab by mouth daily. Refills:  0 Follow-up Information Follow up With Details Comments Contact Info Haloperidol Deconaote BHATIA  On 2018  Haloperidol 100mg IM every 28 days None   None (395) Patient stated that they have no PCP [address] On 2018 @ 9am. Bring identification and rental lease [employer] [facility] [address], [address] [place] [address] 
[phone] Discharge Instructions DISCHARGE SUMMARY 
 
Shiv Wagner : 1965 MRN: [id number] The patient Narayan Markham exhibits the ability to control behavior in a less restrictive environment. Patient's level of functioning is improving. No assaultive/destructive behavior has been observed for the past 24 hours. No suicidal/homicidal threat or behavior has been observed for the past 24 hours.   There is no evidence of serious medication side effects. Patient has not been in physical or protective restraints for at least the past 24 hours. If weapons involved, how are they secured? N/A Is patient aware of and in agreement with discharge plan? Yes Arrangements for medication:  Two weeks of medication given to patient. Referral for substance abuse treatment ? N/A Referral for smoking cessation needed ? N/A Copy of discharge instructions to  provider?:  Yes. Fax: [phone]) Arrangements for transportation home:  Family to [employer] Keep all follow up appointments as scheduled, continue to take prescribed medications per physician instructions. Mental health crisis number:  282 or your local mental health crisis line number at [phone] DISCHARGE SUMMARY from Nurse PATIENT INSTRUCTIONS: 
What to do at Home: 
Recommended activity: Activity as tolerated. If you experience any of the following symptoms thoughts of harming self or others, increased overwhelming fear, overwhelming racing thoughts,  please call 911 or your local [facility] number [phone]. *  Please give a list of your current medications to your Primary Care Provider. *  Please update this list whenever your medications are discontinued, doses are 
    changed, or new medications (including over-the-counter products) are added. *  Please carry medication information at all times in case of emergency situations. These are general instructions for a healthy lifestyle: No smoking/ No tobacco products/ Avoid exposure to second hand smoke Surgeon General's Warning:  Quitting smoking now greatly reduces serious risk to your health. Obesity, smoking, and sedentary lifestyle greatly increases your risk for illness A healthy diet, regular physical exercise & weight monitoring are important for maintaining a healthy lifestyle You may be retaining fluid if you have a history of heart failure or if you experience any of the following symptoms:  Weight gain of 3 pounds or more overnight or 5 pounds in a week, increased swelling in our hands or feet or shortness of breath while lying flat in bed. Please call your doctor as soon as you notice any of these symptoms; do not wait until your next office visit. Recognize signs and symptoms of STROKE: 
 
F-face looks uneven A-arms unable to move or move unevenly S-speech slurred or non-existent T-time-call 911 as soon as signs and symptoms begin-DO NOT go Back to bed or wait to see if you get better-TIME IS BRAIN. Warning Signs of HEART ATTACK Call 911 if you have these symptoms: 
? Chest discomfort. Most heart attacks involve discomfort in the center of the chest that lasts more than a few minutes, or that goes away and comes back. It can feel like uncomfortable pressure, squeezing, fullness, or pain. ? Discomfort in other areas of the upper body. Symptoms can include pain or discomfort in one or both arms, the back, neck, jaw, or stomach. ? Shortness of breath with or without chest discomfort. ? Other signs may include breaking out in a cold sweat, nausea, or lightheadedness. Don't wait more than five minutes to call [address]! Fast action can save your life. Calling 911 is almost always the fastest way to get lifesaving treatment. Emergency Medical Services staff can begin treatment when they arrive  up to an hour sooner than if someone gets to the hospital by car. The discharge information has been reviewed with the patient. The patient verbalized understanding. Discharge medications reviewed with the patient and appropriate educational materials and side effects teaching were provided. ___________________________________________________________________________________________________________________________________ Chart Review Routing History No Routing History on File

## 2018-01-17 NOTE — ED NOTES
Patient was ambulatory to restroom with steady gait and agreed to provide urine specimen. After using the restroom, she said she accidentally urinated in the toilet instead of the cup and asked for Lori Zhao because she is menstruating. She produced a urine cup with scant blood present and was advised that sample was insufficient. Patient now back in room, drinking Sprite, and agrees to attempt again shortly.

## 2018-01-17 NOTE — PROGRESS NOTES
Admission Medication Reconciliation:    Information obtained from: patient, patient's visitor    Significant PMH/Disease States:   Past Medical History:   Diagnosis Date    Arthritis     Bilateral knees    Hallucination     Paranoia ([address]     Psychiatric disorder     anxiety,bipolar       Chief Complaint for this Admission:  mental health    Allergies:  Review of patient's allergies indicates no known allergies. Prior to Admission Medications:   Prior to Admission Medications   Prescriptions Last Dose Informant Patient Reported? Taking?   fish oil-omega-3 fatty acids 340-1,000 mg capsule 1/17/2018  Yes Yes   Sig: Take 1 Cap by mouth daily. lisinopril-hydroCHLOROthiazide (PRINZIDE, ZESTORETIC) 20-25 mg per tablet 1/17/2018  Yes Yes   Sig: Take 1 Tab by mouth daily. therapeutic multivitamin [facility]) tablet 1/17/2018  Yes Yes   Sig: Take 1 Tab by mouth daily. Facility-Administered Medications: None         Comments/Recommendations: Removed lisinopril 5 mg and added the three medications shown above. Of note, patient used to be on lithium, but has not taken this medication recently. Patient noted that she is \"on a fixed income currently and it's hard to afford things. \" Patient was pleasant during encounter.   -

## 2018-01-17 NOTE — ED NOTES
Dr. Phoebe Esquivel states patient is medically clear and may be taken to inpatient psych at this time.

## 2018-01-18 PROCEDURE — 65220000003 HC RM SEMIPRIVATE PSYCH

## 2018-01-18 PROCEDURE — 74011250637 HC RX REV CODE- 250/637: Performed by: PSYCHIATRY & NEUROLOGY

## 2018-01-18 RX ORDER — LISINOPRIL 20 MG/1
20 TABLET ORAL DAILY
Status: DISCONTINUED | OUTPATIENT
Start: 2018-01-18 | End: 2018-01-29 | Stop reason: HOSPADM

## 2018-01-18 RX ORDER — HALOPERIDOL 2 MG/1
2 TABLET ORAL
Status: DISCONTINUED | OUTPATIENT
Start: 2018-01-18 | End: 2018-01-19

## 2018-01-18 RX ORDER — HYDROCHLOROTHIAZIDE 25 MG/1
25 TABLET ORAL DAILY
Status: DISCONTINUED | OUTPATIENT
Start: 2018-01-18 | End: 2018-01-29 | Stop reason: HOSPADM

## 2018-01-18 RX ADMIN — LISINOPRIL 20 MG: 20 TABLET ORAL at 14:34

## 2018-01-18 RX ADMIN — HYDROCHLOROTHIAZIDE 25 MG: 25 TABLET ORAL at 14:34

## 2018-01-18 RX ADMIN — IBUPROFEN 400 MG: 400 TABLET, FILM COATED ORAL at 17:47

## 2018-01-18 RX ADMIN — MAGNESIUM HYDROXIDE 30 ML: 400 SUSPENSION ORAL at 17:50

## 2018-01-18 RX ADMIN — HALOPERIDOL 2 MG: 2 TABLET ORAL at 21:01

## 2018-01-18 RX ADMIN — ACETAMINOPHEN 650 MG: 325 TABLET, FILM COATED ORAL at 08:27

## 2018-01-18 NOTE — PROGRESS NOTES
Problem: Anxiety-Behavioral Health (Adult/Pediatric)  Goal: *STG: Remains safe in hospital  Outcome: Progressing Towards Goal  Patient is lying in bed; interacting with roommate. Alert, calm and cooperative. Patient remains safe in hospital.    1754 - PRN Medication Documentation    Specific patient behavior that led to need for PRN medication: Patient complained of generalized pain and constipation. Staff interventions attempted prior to PRN being given: Offered PRN meds. PRN medication given: Motrin 400 mg PO and Milk of Magnesia 30 ml PO  Patient response/effectiveness of PRN medication: Will continue to monitor.

## 2018-01-18 NOTE — INTERDISCIPLINARY ROUNDS
Behavioral Health Interdisciplinary Rounds     Patient Name: Dorothy Larsen  Age: 50 y.o.   Room/Bed:  4/  Primary Diagnosis: <principal problem not specified>   Admission Status: Voluntary     Readmission within 30 days: no  Power of  in place: no  Patient requires a blocked bed: no          Reason for blocked bed: n/a    VTE Prophylaxis: Not indicated  Flu vaccine given : yes-PTA   Mobility needs/Fall risk: no    Nutritional Plan: no  Consults: no         Labs/Testing due today?: no    Sleep hours: 5:45       Participation in Care/Groups:  no  Medication Compliant?: New Admit  PRNS (last 24 hours): None    Restraints (last 24 hours):  no  Substance Abuse:  no  CIWA (range last 24 hours):  COWS (range last 24 hours):   Alcohol screening (AUDIT) completed -  AUDIT Score: 0  If applicable, date SBIRT discussed in treatment team AND documented: n/a  Tobacco - patient is a smoker: no   Date tobacco education completed by RN: n/a  24 hour chart check complete: yes     Patient goal(s) for today:   Treatment team focus/goals:   Progress note     LOS:  1  Expected LOS:     Financial concerns/prescription coverage:    Date of last family contact:      Family requesting physician contact today:    Discharge plan:   Guns in the home:         Outpatient provider(s):     Participating treatment team members: Dorothy Larsen, * (assigned SW),

## 2018-01-18 NOTE — BH NOTES
GROUP THERAPY PROGRESS NOTE    Roxanne Liliya did not participate in a 50 minute Acute Unit's Process Group, with a focus identifying feelings, planning for the rest of the day, and discussing discharge.

## 2018-01-18 NOTE — BH NOTES
POSITIVE HABITS GROUP THERAPY PROGRESS NOTE    The patient Jeana hernandez 50 y.o. female is participating in Positive Habits Group.     Group time: 15 minutes    Personal goal for participation: Developing daily proccesses    Goal orientation: Habit development    Group therapy participation: active    Therapeutic interventions reviewed and discussed:  Yes    Impression of participation: active with questions    Martin Black  1/18/2018  1:15 PM

## 2018-01-18 NOTE — H&P
PSYCHIATRIC PROGRESS NOTE         Patient Name  Daphne Tai   Date of Birth 1965   [facility] [id number]   Medical Record Number  [id number]      Age  50 y.o. PCP None   Admit date:  1/17/2018    Room Number  734/01  @ [facility]   Date of Service  1/18/2018          PSYCHOTHERAPY SESSION NOTE:  Length of psychotherapy session: 45 minutes    Main condition/diagnosis/issues treated during session today, 1/18/2018 : bipolar disorder, poverty. Seeking resource. I employed Cognitive Behavioral therapy techniques, Reality-Oriented psychotherapy, as well as supportive psychotherapy in regards to various ongoing psychosocial stressors, including the following: pre-admission and current problems; housing issues; occupational issues;medical issues; and stress of hospitalization. Interpersonal relationship issues and psychodynamic conflicts explored. Attempts made to alleviate maladaptive patterns. We, also, worked on issues of denial & effects of substance dependency/use     Overall, patient is not progressing    Treatment Plan Update (reviewed an updated 1/18/2018) : I will modify psychotherapy tx plan by implementing more stress management strategies, building upon cognitive behavioral techniques, increasing coping skills, as well as shoring up psychological defenses). An extended energy and skill set was needed to engage pt in psychotherapy due to some of the following: resistiveness, complexity, negativity, confrontational nature, hostile behaviors, and/or severe abnormalities in thought processes/psychosis resulting in the loss of expressive/receptive language communication skills. E & M PROGRESS NOTE:         HISTORY       CC:  \"poor self care \"  HISTORY OF PRESENT ILLNESS/INTERVAL HISTORY:  (reviewed/updated 1/18/2018). per initial evaluation:     Daphne Tai presents/reports/evidences the following emotional symptoms today, 1/18/2018:psychotic behavior.  The above symptoms have been present for 3 days . These symptoms are of severe severity. The symptoms are constant  in nature. Additional symptomatology and features include anxiety. SIDE EFFECTS: (reviewed/updated 1/18/2018)  None reported or admitted to. No noted toxicity with use of Depakote/Tegretol/lithium/Clozaril/TCAs   ALLERGIES:(reviewed/updated 1/18/2018)  No Known Allergies   MEDICATIONS PRIOR TO ADMISSION:(reviewed/updated 1/18/2018)  Prescriptions Prior to Admission   Medication Sig    lisinopril-hydroCHLOROthiazide (PRINZIDE, ZESTORETIC) 20-25 mg per tablet Take 1 Tab by mouth daily.  fish oil-omega-3 fatty acids 340-1,000 mg capsule Take 1 Cap by mouth daily.  therapeutic multivitamin (THERAGRAN) tablet Take 1 Tab by mouth daily. PAST MEDICAL HISTORY: Past medical history from the initial psychiatric evaluation has been reviewed (reviewed/updated 1/18/2018) with no additional updates (I asked patient and no additional past medical history provided). Past Medical History:   Diagnosis Date    Arthritis     Bilateral knees    Hallucination     Paranoia ([facility] [address]     Psychiatric disorder     anxiety,bipolar   No past surgical history on file. SOCIAL HISTORY: Social history from the initial psychiatric evaluation has been reviewed (reviewed/updated 1/18/2018) with no additional updates (I asked patient and no additional social history provided). Social History     Social History    Marital status: SINGLE     Spouse name: N/A    Number of children: N/A    Years of education: N/A     Occupational History    Not on file. Social History Main Topics    Smoking status: Heavy Tobacco Smoker    Smokeless tobacco: Former User    Alcohol use No    Drug use: No    Sexual activity: Not on file     Other Topics Concern    Not on file     Social History Narrative    50year old AA female admitted on TDO for walking out of her paid apt.  And stating she wants to be homeless because \"people have been following me and tazing me. \" Pt has severe financial problems and this has been a huge stressor. She is not on disability but is inn subsidized housing. She [facility] but has not always complied due to transportation problem. FAMILY HISTORY: Family history from the initial psychiatric evaluation has been reviewed (reviewed/updated 1/18/2018) with no additional updates (I asked patient and no additional family history provided). No family history on file. REVIEW OF SYSTEMS: (reviewed/updated 1/18/2018)  Appetite:no change from normal   Sleep: decreased more than normal   All other Review of Systems: Psychological ROS: positive for - anxiety  Respiratory ROS: no cough, shortness of breath, or wheezing  Cardiovascular ROS: no chest pain or dyspnea on exertion         [facility] (MSE):    [facility] FINDINGS ARE WITHIN NORMAL LIMITS (WNL) UNLESS OTHERWISE STATED BELOW. ( ALL OF THE BELOW CATEGORIES OF THE MSE HAVE BEEN REVIEWED (reviewed 1/18/2018) AND UPDATED AS DEEMED APPROPRIATE )  General Presentation age appropriate, cooperative   Orientation oriented to time, place and person   Vital Signs  See below (reviewed 1/18/2018); Vital Signs (BP, Pulse, & Temp) are within normal limits if not listed below.    Gait and Station Stable/steady, no ataxia   Musculoskeletal System No extrapyramidal symptoms (EPS); no abnormal muscular movements or Tardive Dyskinesia (TD); muscle strength and tone are within normal limits   Language No aphasia or dysarthria   Speech:  normal pitch   Thought Processes concrete; normal rate of thoughts; fair abstract reasoning/computation   Thought Associations goal directed   Thought Content paranoid delusions   Suicidal Ideations none   Homicidal Ideations none   Mood:  stable    Affect:  mood-congruent   Memory recent  fair   Memory remote:  fair   Concentration/Attention:  distractable   Fund of Knowledge average Insight:  good   Reliability poor   Judgment:  limited          VITALS:     Patient Vitals for the past 24 hrs:   Temp Pulse Resp BP SpO2   01/18/18 1145 98.2 °F (36.8 °C) 78 16 104/64 -   01/18/18 0734 99.3 °F (37.4 °C) (!) 101 14 106/68 98 %   01/17/18 1900 98.3 °F (36.8 °C) 91 18 96/61 100 %   01/17/18 1400 98 °F (36.7 °C) 88 20 137/65 98 %     Wt Readings from Last 3 Encounters:   01/18/18 89.8 kg (198 lb)   01/15/18 90.7 kg (200 lb)     Temp Readings from Last 3 Encounters:   01/18/18 98.2 °F (36.8 °C)   01/15/18 98.6 °F (37 °C)   10/11/17 98.5 °F (36.9 °C)     BP Readings from Last 3 Encounters:   01/18/18 104/64   01/15/18 132/67   10/11/17 142/78     Pulse Readings from Last 3 Encounters:   01/18/18 78   01/15/18 80   10/11/17 70            DATA     LABORATORY DATA:(reviewed/updated 1/18/2018)  Recent Results (from the past 24 hour(s))   ETHYL ALCOHOL    Collection Time: 01/17/18  3:09 PM   Result Value Ref Range    ALCOHOL(ETHYL),SERUM <10 <10 MG/DL   CBC WITH AUTOMATED DIFF    Collection Time: 01/17/18  3:09 PM   Result Value Ref Range    WBC 8.2 3.6 - 11.0 K/uL    RBC 4.06 3.80 - 5.20 M/uL    HGB 11.3 (L) 11.5 - 16.0 g/dL    HCT 34.7 (L) 35.0 - 47.0 %    MCV 85.5 80.0 - 99.0 FL    MCH 27.8 26.0 - 34.0 PG    MCHC 32.6 30.0 - 36.5 g/dL    RDW 13.8 11.5 - 14.5 %    PLATELET 680 786 - 221 K/uL    NEUTROPHILS 72 32 - 75 %    LYMPHOCYTES 21 12 - 49 %    MONOCYTES 7 5 - 13 %    EOSINOPHILS 0 0 - 7 %    BASOPHILS 0 0 - 1 %    ABS. NEUTROPHILS 5.9 1.8 - 8.0 K/UL    ABS. LYMPHOCYTES 1.7 0.8 - 3.5 K/UL    ABS. MONOCYTES 0.6 0.0 - 1.0 K/UL    ABS. EOSINOPHILS 0.0 0.0 - 0.4 K/UL    ABS.  BASOPHILS 0.0 0.0 - 0.1 K/UL   METABOLIC PANEL, COMPREHENSIVE    Collection Time: 01/17/18  3:09 PM   Result Value Ref Range    Sodium 135 (L) 136 - 145 mmol/L    Potassium 3.6 3.5 - 5.1 mmol/L    Chloride 100 97 - 108 mmol/L    CO2 26 21 - 32 mmol/L    Anion gap 9 5 - 15 mmol/L    Glucose 96 65 - 100 mg/dL    BUN 43 (H) 6 - 20 MG/DL Creatinine 1.33 (H) 0.55 - 1.02 MG/DL    BUN/Creatinine ratio 32 (H) 12 - 20      GFR est AA 51 (L) >60 ml/min/1.73m2    GFR est non-AA 42 (L) >60 ml/min/1.73m2    Calcium 9.1 8.5 - 10.1 MG/DL    Bilirubin, total 0.6 0.2 - 1.0 MG/DL    ALT (SGPT) 42 12 - 78 U/L    AST (SGOT) 125 (H) 15 - 37 U/L    Alk.  phosphatase 83 45 - 117 U/L    Protein, total 8.2 6.4 - 8.2 g/dL    Albumin 3.7 3.5 - 5.0 g/dL    Globulin 4.5 (H) 2.0 - 4.0 g/dL    A-G Ratio 0.8 (L) 1.1 - 2.2     LITHIUM    Collection Time: 01/17/18  3:09 PM   Result Value Ref Range    Lithium level <0.20 (L) 0.60 - 1.20 MMOL/L    Reported dose date: NOT PROVIDED      Reported dose time: NOT PROVIDED      Reported dose: NOT PROVIDED UNITS   TSH 3RD GENERATION    Collection Time: 01/17/18  3:09 PM   Result Value Ref Range    TSH 1.03 0.36 - 3.74 uIU/mL   DRUG SCREEN, URINE    Collection Time: 01/17/18  5:30 PM   Result Value Ref Range    AMPHETAMINES NEGATIVE  NEG      BARBITURATES NEGATIVE  NEG      BENZODIAZEPINES NEGATIVE  NEG      COCAINE NEGATIVE  NEG      METHADONE NEGATIVE  NEG      OPIATES NEGATIVE  NEG      PCP(PHENCYCLIDINE) NEGATIVE  NEG      THC (TH-CANNABINOL) NEGATIVE  NEG      Drug screen comment (NOTE)    URINALYSIS W/MICROSCOPIC    Collection Time: 01/17/18  5:30 PM   Result Value Ref Range    Color PINK      Appearance CLOUDY (A) CLEAR      Specific gravity 1.023 1.003 - 1.030      pH (UA) 5.0 5.0 - 8.0      Protein TRACE (A) NEG mg/dL    Glucose NEGATIVE  NEG mg/dL    Ketone TRACE (A) NEG mg/dL    Blood LARGE (A) NEG      Urobilinogen 1.0 0.2 - 1.0 EU/dL    Nitrites NEGATIVE  NEG      Leukocyte Esterase SMALL (A) NEG      WBC 5-10 0 - 4 /hpf    RBC >100 (H) 0 - 5 /hpf    Epithelial cells FEW FEW /lpf    Bacteria NEGATIVE  NEG /hpf    Hyaline cast 0-2 0 - 5 /lpf   URINE CULTURE HOLD SAMPLE    Collection Time: 01/17/18  5:30 PM   Result Value Ref Range    Urine culture hold URINE ON HOLD IN MICROBIOLOGY DEPT FOR 3 DAYS     HCG URINE, QL Collection Time: 01/17/18  5:30 PM   Result Value Ref Range    HCG urine, Ql. NEGATIVE  NEG     BILIRUBIN, CONFIRM    Collection Time: 01/17/18  5:30 PM   Result Value Ref Range    Bilirubin UA, confirm NEGATIVE  NEG       No results found for: VALF2, VALAC, VALP, VALPR, DS6, CRBAM, CRBAMP, CARB2, XCRBAM  Lab Results   Component Value Date/Time    Lithium level <0.20 01/17/2018 03:09 PM      RADIOLOGY REPORTS:(reviewed/updated 1/18/2018)  No results found.        MEDICATIONS     ALL MEDICATIONS:   Current Facility-Administered Medications   Medication Dose Route Frequency    ziprasidone (GEODON) 20 mg in sterile water (preservative free) 1 mL injection  20 mg IntraMUSCular BID PRN    OLANZapine (ZyPREXA) tablet 5 mg  5 mg Oral Q6H PRN    benztropine (COGENTIN) tablet 2 mg  2 mg Oral BID PRN    benztropine (COGENTIN) injection 2 mg  2 mg IntraMUSCular BID PRN    LORazepam (ATIVAN) injection 2 mg  2 mg IntraMUSCular Q4H PRN    LORazepam (ATIVAN) tablet 1 mg  1 mg Oral Q4H PRN    acetaminophen (TYLENOL) tablet 650 mg  650 mg Oral Q4H PRN    ibuprofen (MOTRIN) tablet 400 mg  400 mg Oral Q8H PRN    magnesium hydroxide (MILK OF MAGNESIA) 400 mg/5 mL oral suspension 30 mL  30 mL Oral DAILY PRN    nicotine (NICODERM CQ) 21 mg/24 hr patch 1 Patch  1 Patch TransDERmal DAILY PRN    zolpidem (AMBIEN) tablet 5 mg  5 mg Oral QHS PRN      SCHEDULED MEDICATIONS:   Current Facility-Administered Medications   Medication Dose Route Frequency          ASSESSMENT & PLAN     DIAGNOSES REQUIRING ACTIVE TREATMENT AND MONITORING: (reviewed/updated 1/18/2018)  Patient Active Hospital Problem List:   Bipolar 1 disorder ([facility] [address] (1/17/2018)    Assessment: reid alternating with depression     Plan: lithum and haldol            I will continue to monitor blood levels (Depakote, Tegretol, lithium, clozapine---a drug with a narrow therapeutic index= NTI) and associated labs for drug therapy implemented that require intense monitoring for toxicity as deemed appropriate based on current medication side effects and pharmacodynamically determined drug 1/2 lives. In summary, Meagan Jack, is a 50 y.o.  female who presents with a severe exacerbation of the principal diagnosis of Bipolar 1 disorder ([facility] [address]  Patient's condition is worsening/not improving/not stable . Patient requires continued inpatient hospitalization for further stabilization, safety monitoring and medication management. I will continue to coordinate the provision of individual, milieu, occupational, group, and substance abuse therapies to address target symptoms/diagnoses as deemed appropriate for the individual patient. A coordinated, multidisplinary treatment team round was conducted with the patient (this team consists of the nurse, psychiatric unit pharmcist,  and writer). Complete current electronic health record for patient has been reviewed today including consultant notes, ancillary staff notes, nurses and psychiatric tech notes. Suicide risk assessment completed and patient deemed to be of low risk for suicide at this time. The following regarding medications was addressed during rounds with patient:   the risks and benefits of the proposed medication. The patient was given the opportunity to ask questions. Informed consent given to the use of the above medications. Will continue to adjust psychiatric and non-psychiatric medications (see above \"medication\" section and orders section for details) as deemed appropriate & based upon diagnoses and response to treatment. I will continue to order blood tests/labs and diagnostic tests as deemed appropriate and review results as they become available (see orders for details and above listed lab/test results). I will order psychiatric records from previous [facility] hospitals to further elucidate the nature of patient's psychopathology and review once available.     I will gather additional collateral information from friends, family and o/p treatment team to further elucidate the nature of patient's psychopathology and baselline level of psychiatric functioning. I certify that this patient's inpatient psychiatric hospital services furnished since the previous certification were, and continue to be, required for treatment that could reasonably be expected to improve the patient's condition, or for diagnostic study, and that the patient continues to need, on a daily basis, active treatment furnished directly by or requiring the supervision of inpatient psychiatric facility personnel. In addition, the hospital records show that services furnished were intensive treatment services, admission or related services, or equivalent services.     EXPECTED DISCHARGE DATE/DAY: TBD     DISPOSITION: Home       Signed By:   Trevon Lane MD  1/18/2018

## 2018-01-18 NOTE — BH NOTES
PSYCHOSOCIAL ASSESSMENT  :Patient identifying info:  Dorothy Larsen is a 50 y.o., female admitted 1/17/2018  1:56 PM     Presenting problem and precipitating factors:  Pt was admitted to the hospital due to lack of mental; health tx ( off meds) thus she has decompensated. She is delusional, psychotic which lead her to feel unsafe in her apartment. Pt.'s lack of insight effected her judgement ( turned in the keys to her apartment.)   Pt is not currently receiving any out pt psych treatment  SW discuss with team and pt several options for out pt tx and agencies to assist with receiving services: [facility] [address] and [facility] DSS to re-apply for Medicaid     Mental status assessment: Alert, hyper, talkative, tangential but very cooperative and pleasant. Current psychiatric providers and contact info: Tali Love    Previous psychiatric services/providers and response to treatment: No    Family history of mental illness :    Substance abuse history:  Pt is not using any substances and her UDS was negative for tested drugs. Social History   Substance Use Topics    Smoking status: Heavy Tobacco Smoker    Smokeless tobacco: Former User    Alcohol use No       Family constellation:  One adult child    Is significant other involved? N/A    Describe support system: Pt has a very limited support system and she did not name anyone to be involved in her care. Describe living arrangements and home environment: The pt is single, lives alone and has one adult child. Pt is eligible to return to her apartment once she is stabled    Health issues: Review H&P  Hospital Problems  Never Reviewed          Codes Class Noted POA    Bipolar 1 disorder [facility]) ICD-10-CM: F31.9  ICD-9-CM: 296.7  1/17/2018 Unknown              Trauma history: Denied     Legal issues:  Pt has no known legal issues.     History of  service:  N/A    Financial status: Pt has receives governmental assistance and food scarlett. Sabianist/cultural factors:  [address] [employer]     Education/work history: Pt is currently unemployed.     Have you been licensed as a christopher care professional (current or ):  No    Leisure and recreation preferences:     Describe coping skills:  Ineffective and poor judgement    Gudelia Pulse  2018

## 2018-01-18 NOTE — BH NOTES
GROUP THERAPY PROGRESS NOTE    Rosa Lopez is participating in [place].      Group time: 15 minutes    Personal goal for participation: learn the unit    Goal orientation: community    Group therapy participation: active    Therapeutic interventions reviewed and discussed: jasmyne    Impression of participation: actively engaged

## 2018-01-18 NOTE — PROGRESS NOTES
Laboratory Monitoring for Lithium: This patient is currently prescribed the following medication(s):   Current Facility-Administered Medications   Medication Dose Route Frequency     The following labs have been completed for monitoring of lithium:    Lithium Serum Concentration  Lab Results   Component Value Date/Time    Lithium level <0.20 01/17/2018 03:09 PM     Renal Function and Chemistry  Lab Results   Component Value Date/Time    Sodium 135 01/17/2018 03:09 PM    Potassium 3.6 01/17/2018 03:09 PM    Chloride 100 01/17/2018 03:09 PM    CO2 26 01/17/2018 03:09 PM    Anion gap 9 01/17/2018 03:09 PM    BUN 43 01/17/2018 03:09 PM    Creatinine 1.33 01/17/2018 03:09 PM    BUN/Creatinine ratio 32 01/17/2018 03:09 PM     Assessment/Plan:  A lithium level was drawn upon admission on 1/17/18 @ 1509 and found to be <0.2 mmol/L. Patient reports previous history of lithium but denies taking currently due to financial constraints.      Carolina Montana, PharmD, BCPP, [facility], [address] [place] [address]

## 2018-01-18 NOTE — BH NOTES
Lino Dial (RN) [facility]  performed a dual skin assessment on this patient. No impairment noted. Pressure Ulcer Documentation   (COMPLETE ONE LABEL PER PRESSURE ULCER)   For further information, please review corresponding Wound Care flowsheet. No pressure ulcer noted and pressure ulcer prevention initiated.    Mannie Shetty RN

## 2018-01-18 NOTE — PROGRESS NOTES
Problem: Falls - Risk of 1/21/2017  Goal: *Absence of Falls  Document Alysa oWod Fall Risk and appropriate interventions in the flowsheet. Outcome: Progressing Towards Goal  Fall Risk Interventions:Gait is observed as steady. Patient is wearing slip resistant footwear. No falls reported. Problem: Anxiety-Behavioral Health (Adult/Pediatric)  Goal: *STG: Remains safe in hospital  Outcome: Progressing Towards Goal  Pt denies any suicidal or homicidal thoughts. Contracts for safety. Remains on q 15 min safety checks. Problem: Psychosis  Goal: *STG: Remains safe in hospital  Outcome: Progressing Towards Goal  No overt psychotic  symptoms observed. Has been focused on need for BP medication and the feeling that her fingers are swollen. Stated \"I just was upset about my bills and decided I should move and then I walked and realized I don't want to homeless. \"    [facility] [address]  Master Treatment Plan [facility]        Date Treatment Plan Initiated: 1/18/2017      Treatment Plan Modalities:    Type of Modality Amount  (x minutes) Frequency (x/week) Duration (x days) Name of Responsible Staff   Community & wrap-up meetings to encourage peer interactions    15    7    1      RADHA Yao   Group psychotherapy to assist in building coping skills and internal controls    60    7    1    Willie Tamez LCSW   Therapeutic activity groups to build coping skills    60    7    1    Willie Tamez LCSW   Psychoeducation in group setting to address:   Medication education    15    7    1    Lorelei Gonzales RN   Coping skills    20    7    1    Angelita Gunter RN   Relaxation techniques           Symptom management           Discharge planning    15    7    1    Odilia Ruth,    Spirituality     60    7    1    jonn ANDREWS    60    7    1    Volunteer from [employer]   [place] [place] [facility]/AA/NA    60    7    1    Volunteer from [address] [place] [address] medication management    15    7    1    Dr. Rakel Mcgovern Family meeting/discharge planning

## 2018-01-18 NOTE — PROGRESS NOTES
Problem: Anxiety-Behavioral Health (Adult/Pediatric)  Goal: *STG/LTG: Complies with medication therapy  Outcome: Progressing Towards Goal  Pt has not been attending groups or activities. Pt is medication and meal compliant. Pt remains on safety checks every 15 minutes.

## 2018-01-18 NOTE — H&P
INITIAL PSYCHIATRIC EVALUATION            IDENTIFICATION:    Patient Name  Ann Marie Juan   Date of Birth 1965   [facility] [id number]   Medical Record Number  [id number]      Age  50 y.o. PCP None   Admit date:  1/17/2018    Room Number  734/01  @ [facility] [[email or web address]]   Date of Service  1/18/2018            HISTORY         REASON FOR HOSPITALIZATION:  CC: \"self endangerment \". Pt admitted under a temporary group home order (TDO) for  psychosis and reid proving to be an imminent danger to self and an inability to care for self. HISTORY OF PRESENT ILLNESS:    The patient, Ann Marie Juan, is a 50 y.o. BLACK OR  female with a past psychiatric history significant for bipolar d/o , who presents at this time with complaints of (and/or evidence of) the following emotional symptoms: psychosis. Additional symptomatology include thought disorder . The above symptoms have been present for 3 dats . These symptoms are of severe severity. These symptoms are constant  in nature. The patient's condition has been precipitated by medication non compliance and psychosocial stressors (poor resources ). Patient's condition made worse by treatment noncompliance. UDS: negative; BAL=0. ALLERGIES: No Known Allergies   MEDICATIONS PRIOR TO ADMISSION:   Prescriptions Prior to Admission   Medication Sig    lisinopril-hydroCHLOROthiazide (PRINZIDE, ZESTORETIC) 20-25 mg per tablet Take 1 Tab by mouth daily.  fish oil-omega-3 fatty acids 340-1,000 mg capsule Take 1 Cap by mouth daily.  therapeutic multivitamin (THERAGRAN) tablet Take 1 Tab by mouth daily. PAST MEDICAL HISTORY:   Past Medical History:   Diagnosis Date    Arthritis     Bilateral knees    Hallucination     Paranoia ([facility] [address]     Psychiatric disorder     anxiety,bipolar   No past surgical history on file.    SOCIAL HISTORY:    Social History     Social History    Marital status: SINGLE     Spouse name: N/A    Number of children: N/A    Years of education: N/A     Occupational History    Not on file. Social History Main Topics    Smoking status: Heavy Tobacco Smoker    Smokeless tobacco: Former User    Alcohol use No    Drug use: No    Sexual activity: Not on file     Other Topics Concern    Not on file     Social History Narrative    50year old AA female admitted on TDO for walking out of her paid apt. And stating she wants to be homeless because \"people have been following me and tazing me. \" Pt has severe financial problems and this has been a huge stressor. She is not on disability but is inn subsidized housing. [facility] but has not always complied due to transportation problem. FAMILY HISTORY:    No family history on file. REVIEW OF SYSTEMS:   Psychological ROS: positive for - delusion   Respiratory ROS: no cough, shortness of breath, or wheezing  Cardiovascular ROS: no chest pain or dyspnea on exertion  Pertinent items are noted in the History of Present Illness. All other Systems reviewed and are considered negative. MENTAL STATUS EXAM & VITALS     MENTAL STATUS EXAM (MSE):    MSE FINDINGS ARE WITHIN NORMAL LIMITS (WNL) UNLESS OTHERWISE STATED BELOW. ( ALL OF THE BELOW CATEGORIES OF THE MSE HAVE BEEN REVIEWED (reviewed 1/18/2018) AND UPDATED AS DEEMED APPROPRIATE )  General Presentation age appropriate, cooperative   Orientation oriented to time, place and person   Vital Signs  See below (reviewed 1/18/2018); Vital Signs (BP, Pulse, & Temp) are within normal limits if not listed below.    Gait and Station Stable/steady, no ataxia   Musculoskeletal System No extrapyramidal symptoms (EPS); no abnormal muscular movements or Tardive Dyskinesia (TD); muscle strength and tone are within normal limits   Language No aphasia or dysarthria   Speech:  normal pitch   Thought Processes concretel; normal rate of thoughts; fair abstract reasoning/computation   Thought Associations tangential Thought Content paranoid delusions   Suicidal Ideations none   Homicidal Ideations none   Mood:  stable    Affect:  mood-congruent   Memory recent  fair   Memory remote:  fair   Concentration/Attention:  distractable   Fund of Knowledge average   Insight:  limited   Reliability fair   Judgment:  limited          VITALS:     Patient Vitals for the past 24 hrs:   Temp Pulse Resp BP SpO2   01/18/18 1145 98.2 °F (36.8 °C) 78 16 104/64 -   01/18/18 0734 99.3 °F (37.4 °C) (!) 101 14 106/68 98 %   01/17/18 1900 98.3 °F (36.8 °C) 91 18 96/61 100 %   01/17/18 1400 98 °F (36.7 °C) 88 20 137/65 98 %     Wt Readings from Last 3 Encounters:   01/18/18 89.8 kg (198 lb)   01/15/18 90.7 kg (200 lb)     Temp Readings from Last 3 Encounters:   01/18/18 98.2 °F (36.8 °C)   01/15/18 98.6 °F (37 °C)   10/11/17 98.5 °F (36.9 °C)     BP Readings from Last 3 Encounters:   01/18/18 104/64   01/15/18 132/67   10/11/17 142/78     Pulse Readings from Last 3 Encounters:   01/18/18 78   01/15/18 80   10/11/17 70            DATA     LABORATORY DATA:  Labs Reviewed   CBC WITH AUTOMATED DIFF - Abnormal; Notable for the following:        Result Value    HGB 11.3 (*)     HCT 34.7 (*)     All other components within normal limits   METABOLIC PANEL, COMPREHENSIVE - Abnormal; Notable for the following:     Sodium 135 (*)     BUN 43 (*)     Creatinine 1.33 (*)     BUN/Creatinine ratio 32 (*)     GFR est AA 51 (*)     GFR est non-AA 42 (*)     AST (SGOT) 125 (*)     Globulin 4.5 (*)     A-G Ratio 0.8 (*)     All other components within normal limits   LITHIUM - Abnormal; Notable for the following:     Lithium level <0.20 (*)     All other components within normal limits   URINALYSIS W/MICROSCOPIC - Abnormal; Notable for the following:     Appearance CLOUDY (*)     Protein TRACE (*)     Ketone TRACE (*)     Blood LARGE (*)     Leukocyte Esterase SMALL (*)     RBC >100 (*)     All other components within normal limits   URINE CULTURE HOLD SAMPLE   ETHYL ALCOHOL   DRUG SCREEN, URINE   HCG URINE, QL   BILIRUBIN, CONFIRM   TSH 3RD GENERATION     Admission on 01/17/2018   Component Date Value Ref Range Status    ALCOHOL(ETHYL),SERUM 01/17/2018 <10  <10 MG/DL Final    WBC 01/17/2018 8.2  3.6 - 11.0 K/uL Final    RBC 01/17/2018 4.06  3.80 - 5.20 M/uL Final    HGB 01/17/2018 11.3* 11.5 - 16.0 g/dL Final    HCT 01/17/2018 34.7* 35.0 - 47.0 % Final    MCV 01/17/2018 85.5  80.0 - 99.0 FL Final    MCH 01/17/2018 27.8  26.0 - 34.0 PG Final    MCHC 01/17/2018 32.6  30.0 - 36.5 g/dL Final    RDW 01/17/2018 13.8  11.5 - 14.5 % Final    PLATELET 65/71/5655 823  150 - 400 K/uL Final    NEUTROPHILS 01/17/2018 72  32 - 75 % Final    LYMPHOCYTES 01/17/2018 21  12 - 49 % Final    MONOCYTES 01/17/2018 7  5 - 13 % Final    EOSINOPHILS 01/17/2018 0  0 - 7 % Final    BASOPHILS 01/17/2018 0  0 - 1 % Final    ABS. NEUTROPHILS 01/17/2018 5.9  1.8 - 8.0 K/UL Final    ABS. LYMPHOCYTES 01/17/2018 1.7  0.8 - 3.5 K/UL Final    ABS. MONOCYTES 01/17/2018 0.6  0.0 - 1.0 K/UL Final    ABS. EOSINOPHILS 01/17/2018 0.0  0.0 - 0.4 K/UL Final    ABS. BASOPHILS 01/17/2018 0.0  0.0 - 0.1 K/UL Final    Sodium 01/17/2018 135* 136 - 145 mmol/L Final    Potassium 01/17/2018 3.6  3.5 - 5.1 mmol/L Final    Chloride 01/17/2018 100  97 - 108 mmol/L Final    CO2 01/17/2018 26  21 - 32 mmol/L Final    Anion gap 01/17/2018 9  5 - 15 mmol/L Final    Glucose 01/17/2018 96  65 - 100 mg/dL Final    BUN 01/17/2018 43* 6 - 20 MG/DL Final    Creatinine 01/17/2018 1.33* 0.55 - 1.02 MG/DL Final    BUN/Creatinine ratio 01/17/2018 32* 12 - 20   Final    GFR est AA 01/17/2018 51* >60 ml/min/1.73m2 Final    GFR est non-AA 01/17/2018 42* >60 ml/min/1.73m2 Final    Calcium 01/17/2018 9.1  8.5 - 10.1 MG/DL Final    Bilirubin, total 01/17/2018 0.6  0.2 - 1.0 MG/DL Final    ALT (SGPT) 01/17/2018 42  12 - 78 U/L Final    AST (SGOT) 01/17/2018 125* 15 - 37 U/L Final    Alk.  phosphatase 01/17/2018 83  45 - 117 U/L Final    Protein, total 01/17/2018 8.2  6.4 - 8.2 g/dL Final    Albumin 01/17/2018 3.7  3.5 - 5.0 g/dL Final    Globulin 01/17/2018 4.5* 2.0 - 4.0 g/dL Final    A-G Ratio 01/17/2018 0.8* 1.1 - 2.2   Final    Lithium level 01/17/2018 <0.20* 0.60 - 1.20 MMOL/L Final    Reported dose date: 01/17/2018 NOT PROVIDED    Final    Reported dose time: 01/17/2018 NOT PROVIDED    Final    Reported dose: 01/17/2018 NOT PROVIDED  UNITS Final    AMPHETAMINES 01/17/2018 NEGATIVE   NEG   Final    BARBITURATES 01/17/2018 NEGATIVE   NEG   Final    BENZODIAZEPINES 01/17/2018 NEGATIVE   NEG   Final    COCAINE 01/17/2018 NEGATIVE   NEG   Final    METHADONE 01/17/2018 NEGATIVE   NEG   Final    OPIATES 01/17/2018 NEGATIVE   NEG   Final    PCP(PHENCYCLIDINE) 01/17/2018 NEGATIVE   NEG   Final    THC (TH-CANNABINOL) 01/17/2018 NEGATIVE   NEG   Final    Drug screen comment 01/17/2018 (NOTE)   Final    Color 01/17/2018 PINK    Final    Appearance 01/17/2018 CLOUDY* CLEAR   Final    Specific gravity 01/17/2018 1.023  1.003 - 1.030   Final    pH (UA) 01/17/2018 5.0  5.0 - 8.0   Final    Protein 01/17/2018 TRACE* NEG mg/dL Final    Glucose 01/17/2018 NEGATIVE   NEG mg/dL Final    Ketone 01/17/2018 TRACE* NEG mg/dL Final    Blood 01/17/2018 LARGE* NEG   Final    Urobilinogen 01/17/2018 1.0  0.2 - 1.0 EU/dL Final    Nitrites 01/17/2018 NEGATIVE   NEG   Final    Leukocyte Esterase 01/17/2018 SMALL* NEG   Final    WBC 01/17/2018 5-10  0 - 4 /hpf Final    RBC 01/17/2018 >100* 0 - 5 /hpf Final    Epithelial cells 01/17/2018 FEW  FEW /lpf Final    Bacteria 01/17/2018 NEGATIVE   NEG /hpf Final    Hyaline cast 01/17/2018 0-2  0 - 5 /lpf Final    Urine culture hold 01/17/2018 URINE ON HOLD IN MICROBIOLOGY DEPT FOR 3 DAYS    Final    HCG urine, Ql. 01/17/2018 NEGATIVE   NEG   Final    Bilirubin UA, confirm 01/17/2018 NEGATIVE   NEG   Final    TSH 01/17/2018 1.03  0.36 - 3.74 uIU/mL Final        RADIOLOGY REPORTS:  No results found for this or any previous visit. No results found. MEDICATIONS       ALL MEDICATIONS  Current Facility-Administered Medications   Medication Dose Route Frequency    ziprasidone (GEODON) 20 mg in sterile water (preservative free) 1 mL injection  20 mg IntraMUSCular BID PRN    OLANZapine (ZyPREXA) tablet 5 mg  5 mg Oral Q6H PRN    benztropine (COGENTIN) tablet 2 mg  2 mg Oral BID PRN    benztropine (COGENTIN) injection 2 mg  2 mg IntraMUSCular BID PRN    LORazepam (ATIVAN) injection 2 mg  2 mg IntraMUSCular Q4H PRN    LORazepam (ATIVAN) tablet 1 mg  1 mg Oral Q4H PRN    acetaminophen (TYLENOL) tablet 650 mg  650 mg Oral Q4H PRN    ibuprofen (MOTRIN) tablet 400 mg  400 mg Oral Q8H PRN    magnesium hydroxide (MILK OF MAGNESIA) 400 mg/5 mL oral suspension 30 mL  30 mL Oral DAILY PRN    nicotine (NICODERM CQ) 21 mg/24 hr patch 1 Patch  1 Patch TransDERmal DAILY PRN    zolpidem (AMBIEN) tablet 5 mg  5 mg Oral QHS PRN      SCHEDULED MEDICATIONS  Current Facility-Administered Medications   Medication Dose Route Frequency                ASSESSMENT & PLAN        The patient, Dain Lawrence, is a 50 y.o.  female who presents at this time for treatment of the following diagnoses:  Patient Active Hospital Problem List:   Bipolar 1 disorder ([facility] [address] (1/17/2018)    Assessment: reid alternating with depression     Plan: start mood stabilizer and antupsychotic          I will continue to monitor blood levels (Depakote, Tegretol, lithium, clozapine---a drug with a narrow therapeutic index= NTI) and associated labs for drug therapy implemented that require intense monitoring for toxicity as deemed appropriate based on current medication side effects and pharmacodynamically determined drug 1/2 lives. A coordinated, multidisplinary treatment team (includes the nurse, unit pharmcist,  and writer) round was conducted for this initial evaluation with the patient present. The following regarding medications was addressed during rounds with patient: lithium   the risks and benefits of the proposed medication. The patient was given the opportunity to ask questions. Informed consent given to the use of the above medications. I will continue to adjust psychiatric and non-psychiatric medications (see above \"medication\" section and orders section for details) as deemed appropriate & based upon diagnoses and response to treatment. I have reviewed admission (and previous/old) labs and medical tests in the EHR and or transferring hospital documents. I will continue to order blood tests/labs and diagnostic tests as deemed appropriate and review results as they become available (see orders for details). I have reviewed old psychiatric and medical records available in the EHR. I Will order additional psychiatric records from other institutions to further elucidate the nature of patient's psychopathology and review once available. I will gather additional collateral information from friends, family and o/p treatment team to further elucidate the nature of patient's psychopathology and baselline level of psychiatric functioning.       ESTIMATED LENGTH OF STAY:    5-7 days        STRENGTHS:  Exercising self-direction/Resourceful, Access to housing/residential stability and Knowledge of medications                                        SIGNED:    Low Rawls MD  1/18/2018

## 2018-01-19 LAB
ALBUMIN SERPL-MCNC: 3.5 G/DL (ref 3.5–5)
ALBUMIN/GLOB SERPL: 0.8 {RATIO} (ref 1.1–2.2)
ALP SERPL-CCNC: 86 U/L (ref 45–117)
ALT SERPL-CCNC: 47 U/L (ref 12–78)
ANION GAP SERPL CALC-SCNC: 5 MMOL/L (ref 5–15)
AST SERPL-CCNC: 86 U/L (ref 15–37)
BILIRUB SERPL-MCNC: 0.4 MG/DL (ref 0.2–1)
BUN SERPL-MCNC: 21 MG/DL (ref 6–20)
BUN/CREAT SERPL: 25 (ref 12–20)
CALCIUM SERPL-MCNC: 8.9 MG/DL (ref 8.5–10.1)
CHLORIDE SERPL-SCNC: 100 MMOL/L (ref 97–108)
CO2 SERPL-SCNC: 30 MMOL/L (ref 21–32)
CREAT SERPL-MCNC: 0.85 MG/DL (ref 0.55–1.02)
GLOBULIN SER CALC-MCNC: 4.6 G/DL (ref 2–4)
GLUCOSE SERPL-MCNC: 96 MG/DL (ref 65–100)
POTASSIUM SERPL-SCNC: 4 MMOL/L (ref 3.5–5.1)
PROT SERPL-MCNC: 8.1 G/DL (ref 6.4–8.2)
SODIUM SERPL-SCNC: 135 MMOL/L (ref 136–145)

## 2018-01-19 PROCEDURE — 74011250637 HC RX REV CODE- 250/637: Performed by: PSYCHIATRY & NEUROLOGY

## 2018-01-19 PROCEDURE — 36415 COLL VENOUS BLD VENIPUNCTURE: CPT | Performed by: PSYCHIATRY & NEUROLOGY

## 2018-01-19 PROCEDURE — 65220000003 HC RM SEMIPRIVATE PSYCH

## 2018-01-19 PROCEDURE — 80053 COMPREHEN METABOLIC PANEL: CPT | Performed by: PSYCHIATRY & NEUROLOGY

## 2018-01-19 RX ORDER — LITHIUM CARBONATE 450 MG/1
900 TABLET ORAL
Status: DISCONTINUED | OUTPATIENT
Start: 2018-01-19 | End: 2018-01-22

## 2018-01-19 RX ORDER — ZOLPIDEM TARTRATE 10 MG/1
10 TABLET ORAL
Status: DISCONTINUED | OUTPATIENT
Start: 2018-01-19 | End: 2018-01-29 | Stop reason: HOSPADM

## 2018-01-19 RX ORDER — HALOPERIDOL 5 MG/1
5 TABLET ORAL
Status: DISCONTINUED | OUTPATIENT
Start: 2018-01-19 | End: 2018-01-22

## 2018-01-19 RX ADMIN — LISINOPRIL 20 MG: 20 TABLET ORAL at 08:30

## 2018-01-19 RX ADMIN — HYDROCHLOROTHIAZIDE 25 MG: 25 TABLET ORAL at 08:30

## 2018-01-19 RX ADMIN — ACETAMINOPHEN 650 MG: 325 TABLET, FILM COATED ORAL at 08:30

## 2018-01-19 RX ADMIN — LITHIUM CARBONATE 900 MG: 450 TABLET, EXTENDED RELEASE ORAL at 21:31

## 2018-01-19 RX ADMIN — HALOPERIDOL 5 MG: 5 TABLET ORAL at 21:31

## 2018-01-19 NOTE — PROGRESS NOTES
Problem: Anxiety-Behavioral Health (Adult/Pediatric)  Goal: *STG: Remains safe in hospital  Outcome: Progressing Towards Goal  Pt remains safe in hospital on q 15 min safety checks. No self harming behaviors observed. Pt isolating room. Primary coping skill: reading. Goal: *STG/LTG: Complies with medication therapy  Outcome: Progressing Towards Goal  Pt compliant with medications. Hyper verbal, tangential. Poor safety awareness. Pleasant. 0930- pt singing Yazidism songs in dining room. Attending group. Pt states last BM was on 1/18/18. Oral fluids encouraged. Pt tolerating well. Pt preoccupied with olfactory senses. \"Is that why I smell? \" pt asking repeatedly. Pt guarded on unit. More verbal during 1:1 therapeutic communication. Pt meeting with treatment team. Plan: Lab work: Lipid panel and  Lithium level 1/23/18.

## 2018-01-19 NOTE — BH NOTES
GROUP THERAPY PROGRESS NOTE    Jeana Bolden participated in the Acute Unit's afternoon Process Group, with a focus on identifying feelings, planning for the rest of the day, and preparing for discharge. Group time: 60 minutes. Personal goal for participation: To increase the capacity to improve ones mood and structure. Goal orientation: The patient will be able to identify their feelings, develop a plan for structuring their day, and discharge planning. Group therapy participation: With prompting, this patient partially participated in the group. Therapeutic interventions reviewed and discussed: The group members were asked to introduce themselves to each other and to see if they could identify an emotion they are having and/or let the group know what they want to focus on for the day as they continue to make discharge plans. Impression of participation: The patient joined the group about residential through and initially sat and listened to her peers. After prompting she said she wanted to work on [employer]. \" She went on to describe how she turned in the keys to her apartment and after walking around in to the late evening, she decided to go back and see if she could get her apartment back. She added that her landlady gave her keys back but that representatives from the local [facility] met her and she was brought to this hospital. It was clear there were some pieces missing to her story and she was concerned to let me know that she wanted to work and earn her way as much as possible. She expressed no current SI/HI and displayed no overt psychotic symptoms in this group, although she spoke very concretely, she may have been responding to internal stimuli when she walked away from her apartment? Her affect was constrained and her mood matched her affect. This was the first process group the patient has had with the undersigned.

## 2018-01-19 NOTE — PROGRESS NOTES
Problem: Psychosis  Goal: *STG: Remains safe in hospital  Outcome: Progressing Towards Goal  Patient is resting quietly in bed. Appears to be asleep. No respiratory distress noted. Will continue to monitor.

## 2018-01-19 NOTE — PROGRESS NOTES
Laboratory Monitoring for Antipsychotics: This patient is currently prescribed the following medication(s):   Current Facility-Administered Medications   Medication Dose Route Frequency    haloperidol (HALDOL) tablet 2 mg  2 mg Oral QHS    lisinopril (PRINIVIL, ZESTRIL) tablet 20 mg  20 mg Oral DAILY    hydroCHLOROthiazide (HYDRODIURIL) tablet 25 mg  25 mg Oral DAILY     The following labs have been completed for monitoring of antipsychotics and/or mood stabilizers:    Height, Weight, BMI Estimation  Estimated body mass index is 26.85 kg/(m^2) as calculated from the following:    Height as of 1/15/18: 182.9 cm (72\"). Weight as of this encounter: 89.8 kg (198 lb). Vital Signs/Blood Pressure  Visit Vitals    /77    Pulse 94    Temp 98.3 °F (36.8 °C)    Resp 18    Wt 89.8 kg (198 lb)    SpO2 97%    Breastfeeding No    BMI 26.85 kg/m2     Renal Function, Hepatic Function and Chemistry  Estimated Creatinine Clearance: 98.6 mL/min (based on Cr of 0.85). Lab Results   Component Value Date/Time    Sodium 135 01/19/2018 05:55 AM    Potassium 4.0 01/19/2018 05:55 AM    Chloride 100 01/19/2018 05:55 AM    CO2 30 01/19/2018 05:55 AM    Anion gap 5 01/19/2018 05:55 AM    BUN 21 01/19/2018 05:55 AM    Creatinine 0.85 01/19/2018 05:55 AM    BUN/Creatinine ratio 25 01/19/2018 05:55 AM    Bilirubin, total 0.4 01/19/2018 05:55 AM    Protein, total 8.1 01/19/2018 05:55 AM    Albumin 3.5 01/19/2018 05:55 AM    Globulin 4.6 01/19/2018 05:55 AM    A-G Ratio 0.8 01/19/2018 05:55 AM    ALT (SGPT) 47 01/19/2018 05:55 AM    Alk.  phosphatase 86 01/19/2018 05:55 AM     Lab Results   Component Value Date/Time    Glucose 96 01/19/2018 05:55 AM     No results found for: HBA1C, HGBE8, WXH1AGZM    Hematology  Lab Results   Component Value Date/Time    WBC 8.2 01/17/2018 03:09 PM    RBC 4.06 01/17/2018 03:09 PM    HGB 11.3 01/17/2018 03:09 PM    HCT 34.7 01/17/2018 03:09 PM    MCV 85.5 01/17/2018 03:09 PM    MCH 27.8 01/17/2018 03:09 PM    MCHC 32.6 01/17/2018 03:09 PM    RDW 13.8 01/17/2018 03:09 PM    PLATELET 782 62/05/0752 03:09 PM     Lipids  No results found for: CHOL, CHOLX, CHLST, CHOLV, 284894, HDL, LDL, LDLC, DLDLP, TGLX, TRIGL, TRIGP, CHHD, CHHDX    Thyroid Function  Lab Results   Component Value Date/Time    TSH 1.03 01/17/2018 03:09 PM     Pregnancy Status  Lab Results   Component Value Date/Time    HCG urine, Ql. NEGATIVE  01/17/2018 05:30 PM     Assessment/Plan:  Will order lipid panel for 1/23 AM (with lithium level)  to complete the recommended baseline laboratory monitoring based on the patient's current medication regimen.       Flower Palma, PharmD, BCPP, [facility] Specialist, [facility]

## 2018-01-19 NOTE — BH NOTES
GROUP THERAPY PROGRESS NOTE    Duke Rojas is participating in [place]. Group time: 15 minutes    Personal goal for participation: To be independent and get a job. Goal orientation: community    Group therapy participation: active    Therapeutic interventions reviewed and discussed: Reviewed community guidelines and discussed personal goals.     Impression of participation: Active

## 2018-01-19 NOTE — PROGRESS NOTES
Problem: Anxiety-Behavioral Health (Adult/Pediatric)  Goal: *STG: Remains safe in hospital  Outcome: Progressing Towards Goal  Pt denies any suicidal or homicidal thoughts. Contracts for safety. Remains on q 15 min safety checks. Goal: *STG: Attends activities and groups  Outcome: Progressing Towards Goal  Has attended with appropriate interaction. Goal: *STG/LTG: Complies with medication therapy  Outcome: Progressing Towards Goal  Has been total medication compliant. Denies AH/VH. sTATED \"SOMETIMES I HEAR MY OWN SELF TALK.

## 2018-01-19 NOTE — INTERDISCIPLINARY ROUNDS
Behavioral Health Interdisciplinary Rounds     Patient Name: Dain Lawrence  Age: 48 y.o. Room/Bed:  734/  Primary Diagnosis: Bipolar 1 disorder (HCC)   Admission Status: Voluntary     Readmission within 30 days: no  Power of  in place: no  Patient requires a blocked bed: no          Reason for blocked bed: n/a    VTE Prophylaxis: Not indicated  Flu vaccine given : yes-PTA   Mobility needs/Fall risk: no    Nutritional Plan: no  Consults: no         Labs/Testing due today?: Yes-Metabolic panel    Sleep hours: 6:30       Participation in Care/Groups:  yes  Medication Compliant?: Yes  PRNS (last 24 hours): Pain    Restraints (last 24 hours):  no  Substance Abuse:  no  CIWA (range last 24 hours):  COWS (range last 24 hours):   Alcohol screening (AUDIT) completed -  AUDIT Score: 0  If applicable, date SBIRT discussed in treatment team AND documented: n/a  Tobacco - patient is a smoker: no   Date tobacco education completed by RN: n/a  24 hour chart check complete: yes     Patient goal(s) for today: Comply with tx and meet with Tx team to review meds and adjustment to first in pt hospitalization  Treatment team focus/goals: Eval meds for their effectiveness   Progress note: Good mood, repetitive comments regarding her character and paranoid thoughts as to  what others may think of her. Pt said she is still adjusting to being in the hospital but she does feel safe. LOS:  2  Expected LOS:     Financial concerns/prescription coverage:  Self pay  Date of last family contact:       Family requesting physician contact today:    Discharge plan: Return to her apartment  Guns in the home:  N/A       Outpatient provider(s): Link to [facility] [place], operated [facility] for [employer]     Participating treatment team members: Izora Sayer, Dr Cari Dole, C. Lewanda Felty RN, Tiffanie De La Rosa PharmD and Qi Pinto

## 2018-01-19 NOTE — BH NOTES
GROUP THERAPY PROGRESS NOTE    Duke Rojas is participating in [place]. Group time: 30 minutes    Personal goal for participation: unit orientation and daily progress    Goal orientation: personal    Group therapy participation: active    Therapeutic interventions reviewed and discussed: \"Are you under stress? \" and \"Nutrition\" word search handouts. Impression of participation: Seems in fair spirits. Adjusting to unit okay. Comments appropriate to group discussion.

## 2018-01-19 NOTE — BH NOTES
Received pt on unit. No voiced complaints or acute distress at present.   Note no agitation thus far

## 2018-01-19 NOTE — PROGRESS NOTES
8345- PRN Medication Documentation    Specific patient behavior that led to need for PRN medication: pt c/o aching discomfort to right and left hand 2/10 \"I was walking, holding bags\".    Staff interventions attempted prior to PRN being given: education, therapeutic communication   PRN medication given: po 650 mg tylenol   Patient response/effectiveness of PRN medication: pt reading in room

## 2018-01-20 PROCEDURE — 65220000003 HC RM SEMIPRIVATE PSYCH

## 2018-01-20 PROCEDURE — 74011250637 HC RX REV CODE- 250/637: Performed by: PSYCHIATRY & NEUROLOGY

## 2018-01-20 RX ORDER — TIZANIDINE 2 MG/1
2 TABLET ORAL
Status: DISCONTINUED | OUTPATIENT
Start: 2018-01-20 | End: 2018-01-29 | Stop reason: HOSPADM

## 2018-01-20 RX ADMIN — LISINOPRIL 20 MG: 20 TABLET ORAL at 08:03

## 2018-01-20 RX ADMIN — LITHIUM CARBONATE 900 MG: 450 TABLET, EXTENDED RELEASE ORAL at 21:15

## 2018-01-20 RX ADMIN — HYDROCHLOROTHIAZIDE 25 MG: 25 TABLET ORAL at 08:03

## 2018-01-20 RX ADMIN — HALOPERIDOL 5 MG: 5 TABLET ORAL at 21:15

## 2018-01-20 NOTE — BH NOTES
PSYCHIATRIC PROGRESS NOTE         Patient Name  Isai Stein   Date of Birth 1965   [facility] [id number]   Medical Record Number  [id number]      Age  48 y.o. PCP None   Admit date:  1/17/2018    Room Number  734/01  @ [facility]   Date of Service  1/20/2018          PSYCHOTHERAPY SESSION NOTE:  Length of psychotherapy session: 20 minutes    Main condition/diagnosis/issues treated during session today, 1/20/2018 : bipolar disorder, poverty. Seeking resource. I employed Cognitive Behavioral therapy techniques, Reality-Oriented psychotherapy, as well as supportive psychotherapy in regards to various ongoing psychosocial stressors, including the following: pre-admission and current problems; housing issues; occupational issues;medical issues; and stress of hospitalization. Interpersonal relationship issues and psychodynamic conflicts explored. Attempts made to alleviate maladaptive patterns. We, also, worked on issues of denial & effects of substance dependency/use     Overall, patient is not progressing    Treatment Plan Update (reviewed an updated 1/20/2018) : I will modify psychotherapy tx plan by implementing more stress management strategies, building upon cognitive behavioral techniques, increasing coping skills, as well as shoring up psychological defenses). An extended energy and skill set was needed to engage pt in psychotherapy due to some of the following: resistiveness, complexity, negativity, confrontational nature, hostile behaviors, and/or severe abnormalities in thought processes/psychosis resulting in the loss of expressive/receptive language communication skills. E & M PROGRESS NOTE:         HISTORY       CC:  \"poor self care \"  HISTORY OF PRESENT ILLNESS/INTERVAL HISTORY:  (reviewed/updated 1/20/2018).   per initial evaluation:     Isai Stein presents/reports/evidences the following emotional symptoms today, 1/20/2018:psychotic behavior and manic behavior. The above symptoms have been present for several days . These symptoms are of severe severity. The symptoms are constant  in nature. Additional symptomatology and features include anxiety, physical complaints. SIDE EFFECTS: (reviewed/updated 1/20/2018)  None reported or admitted to. No noted toxicity with use of lithium   ALLERGIES:(reviewed/updated 1/20/2018)  No Known Allergies   MEDICATIONS PRIOR TO ADMISSION:(reviewed/updated 1/20/2018)  Prescriptions Prior to Admission   Medication Sig    lisinopril-hydroCHLOROthiazide (PRINZIDE, ZESTORETIC) 20-25 mg per tablet Take 1 Tab by mouth daily.  fish oil-omega-3 fatty acids 340-1,000 mg capsule Take 1 Cap by mouth daily.  therapeutic multivitamin (THERAGRAN) tablet Take 1 Tab by mouth daily. PAST MEDICAL HISTORY: Past medical history from the initial psychiatric evaluation has been reviewed (reviewed/updated 1/20/2018) with no additional updates (I asked patient and no additional past medical history provided). Past Medical History:   Diagnosis Date    Arthritis     Bilateral knees    Hallucination     Paranoia ([facility]     Psychiatric disorder     anxiety,bipolar   No past surgical history on file. SOCIAL HISTORY: Social history from the initial psychiatric evaluation has been reviewed (reviewed/updated 1/20/2018) with no additional updates (I asked patient and no additional social history provided). Social History     Social History    Marital status: SINGLE     Spouse name: N/A    Number of children: N/A    Years of education: N/A     Occupational History    Not on file. Social History Main Topics    Smoking status: Heavy Tobacco Smoker    Smokeless tobacco: Former User    Alcohol use No    Drug use: No    Sexual activity: Not on file     Other Topics Concern    Not on file     Social History Narrative    50year old AA female admitted on TDO for walking out of her paid apt.  And stating she wants to be homeless because \"people have been following me and tazing me. \" Pt has severe financial problems and this has been a huge stressor. She is not on disability but is inn subsidized housing. [facility] but has not always complied due to transportation problem. FAMILY HISTORY: Family history from the initial psychiatric evaluation has been reviewed (reviewed/updated 1/20/2018) with no additional updates (I asked patient and no additional family history provided). No family history on file. REVIEW OF SYSTEMS: (reviewed/updated 1/20/2018)  Appetite:no change from normal   Sleep: decreased more than normal   All other Review of Systems: Psychological ROS: positive for - anxiety  Respiratory ROS: no cough, shortness of breath, or wheezing  Cardiovascular ROS: no chest pain or dyspnea on exertion         [address] [facility] ([facility]):    [facility] FINDINGS ARE WITHIN NORMAL LIMITS (WNL) UNLESS OTHERWISE STATED BELOW. ( ALL OF THE BELOW CATEGORIES OF THE MSE HAVE BEEN REVIEWED (reviewed 1/20/2018) AND UPDATED AS DEEMED APPROPRIATE )  General Presentation age appropriate, cooperative   Orientation oriented to time, place and person   Vital Signs  See below (reviewed 1/20/2018); Vital Signs (BP, Pulse, & Temp) are within normal limits if not listed below.    Gait and Station Stable/steady, no ataxia   Musculoskeletal System No extrapyramidal symptoms (EPS); no abnormal muscular movements or Tardive Dyskinesia (TD); muscle strength and tone are within normal limits   Language No aphasia or dysarthria   Speech:  normal pitch   Thought Processes concrete; normal rate of thoughts; fair abstract reasoning/computation   Thought Associations goal directed   Thought Content paranoid delusions   Suicidal Ideations none   Homicidal Ideations none   Mood:  stable    Affect:  mood-congruent   Memory recent  fair   Memory remote:  fair   Concentration/Attention:  distractable   Fund of Knowledge average   Insight:  good   Reliability poor   Judgment:  limited          VITALS:     Patient Vitals for the past 24 hrs:   Temp Pulse Resp BP SpO2   01/20/18 1756 97.8 °F (36.6 °C) 100 16 111/67 100 %   01/20/18 1211 97.8 °F (36.6 °C) 92 18 126/78 -   01/20/18 0820 98.4 °F (36.9 °C) (!) 103 16 152/84 -   01/19/18 2035 98.5 °F (36.9 °C) 86 20 116/79 -     Wt Readings from Last 3 Encounters:   01/18/18 89.8 kg (198 lb)   01/15/18 90.7 kg (200 lb)     Temp Readings from Last 3 Encounters:   01/20/18 97.8 °F (36.6 °C)   01/15/18 98.6 °F (37 °C)   10/11/17 98.5 °F (36.9 °C)     BP Readings from Last 3 Encounters:   01/20/18 111/67   01/15/18 132/67   10/11/17 142/78     Pulse Readings from Last 3 Encounters:   01/20/18 100   01/15/18 80   10/11/17 70            DATA     LABORATORY DATA:(reviewed/updated 1/20/2018)  No results found for this or any previous visit (from the past 24 hour(s)). No results found for: VALF2, VALAC, VALP, VALPR, DS6, CRBAM, CRBAMP, CARB2, XCRBAM  Lab Results   Component Value Date/Time    Lithium level <0.20 01/17/2018 03:09 PM      RADIOLOGY REPORTS:(reviewed/updated 1/20/2018)  No results found.        MEDICATIONS     ALL MEDICATIONS:   Current Facility-Administered Medications   Medication Dose Route Frequency    tiZANidine (ZANAFLEX) tablet 2 mg  2 mg Oral Q6H PRN    haloperidol (HALDOL) tablet 5 mg  5 mg Oral QHS    lithium carbonate CR (ESKALITH CR) tablet 900 mg  900 mg Oral QHS    zolpidem (AMBIEN) tablet 10 mg  10 mg Oral QHS PRN    lisinopril (PRINIVIL, ZESTRIL) tablet 20 mg  20 mg Oral DAILY    hydroCHLOROthiazide (HYDRODIURIL) tablet 25 mg  25 mg Oral DAILY    ziprasidone (GEODON) 20 mg in sterile water (preservative free) 1 mL injection  20 mg IntraMUSCular BID PRN    OLANZapine (ZyPREXA) tablet 5 mg  5 mg Oral Q6H PRN    benztropine (COGENTIN) tablet 2 mg  2 mg Oral BID PRN    benztropine (COGENTIN) injection 2 mg  2 mg IntraMUSCular BID PRN    LORazepam (ATIVAN) injection 2 mg  2 mg IntraMUSCular Q4H PRN    LORazepam (ATIVAN) tablet 1 mg  1 mg Oral Q4H PRN    acetaminophen (TYLENOL) tablet 650 mg  650 mg Oral Q4H PRN    ibuprofen (MOTRIN) tablet 400 mg  400 mg Oral Q8H PRN    magnesium hydroxide (MILK OF MAGNESIA) 400 mg/5 mL oral suspension 30 mL  30 mL Oral DAILY PRN    nicotine (NICODERM CQ) 21 mg/24 hr patch 1 Patch  1 Patch TransDERmal DAILY PRN      SCHEDULED MEDICATIONS:   Current Facility-Administered Medications   Medication Dose Route Frequency    haloperidol (HALDOL) tablet 5 mg  5 mg Oral QHS    lithium carbonate CR (ESKALITH CR) tablet 900 mg  900 mg Oral QHS    lisinopril (PRINIVIL, ZESTRIL) tablet 20 mg  20 mg Oral DAILY    hydroCHLOROthiazide (HYDRODIURIL) tablet 25 mg  25 mg Oral DAILY          ASSESSMENT & PLAN     DIAGNOSES REQUIRING ACTIVE TREATMENT AND MONITORING: (reviewed/updated 1/20/2018)  Patient Active Hospital Problem List:   Bipolar 1 disorder ([facility] (1/17/2018)    Assessment: reid alternating with depression     Plan: lithum and haldol      Back pain  Assessment: muscle sprain  Plan: Tizanidine            I will continue to monitor blood levels (Depakote, Tegretol, lithium, clozapine---a drug with a narrow therapeutic index= NTI) and associated labs for drug therapy implemented that require intense monitoring for toxicity as deemed appropriate based on current medication side effects and pharmacodynamically determined drug 1/2 lives. In summary, Narayan Markham, is a 48 y.o.  female who presents with a severe exacerbation of the principal diagnosis of Bipolar 1 disorder ([facility]  Patient's condition is worsening/not improving/not stable . Patient requires continued inpatient hospitalization for further stabilization, safety monitoring and medication management.   I will continue to coordinate the provision of individual, milieu, occupational, group, and substance abuse therapies to address target symptoms/diagnoses as deemed appropriate for the individual patient. A coordinated, multidisplinary treatment team round was conducted with the patient (this team consists of the nurse, psychiatric unit pharmcist,  and writer). Complete current electronic health record for patient has been reviewed today including consultant notes, ancillary staff notes, nurses and psychiatric tech notes. Suicide risk assessment completed and patient deemed to be of low risk for suicide at this time. The following regarding medications was addressed during rounds with patient:   the risks and benefits of the proposed medication. The patient was given the opportunity to ask questions. Informed consent given to the use of the above medications. Will continue to adjust psychiatric and non-psychiatric medications (see above \"medication\" section and orders section for details) as deemed appropriate & based upon diagnoses and response to treatment. I will continue to order blood tests/labs and diagnostic tests as deemed appropriate and review results as they become available (see orders for details and above listed lab/test results). I will order psychiatric records from previous [facility] hospitals to further elucidate the nature of patient's psychopathology and review once available. I will gather additional collateral information from friends, family and o/p treatment team to further elucidate the nature of patient's psychopathology and baselline level of psychiatric functioning. I certify that this patient's inpatient psychiatric hospital services furnished since the previous certification were, and continue to be, required for treatment that could reasonably be expected to improve the patient's condition, or for diagnostic study, and that the patient continues to need, on a daily basis, active treatment furnished directly by or requiring the supervision of inpatient psychiatric facility personnel.  In addition, the hospital records show that services furnished were intensive treatment services, admission or related services, or equivalent services.     EXPECTED DISCHARGE DATE/DAY: TBD     DISPOSITION: Home       Signed By:   Bee Mars MD  1/20/2018

## 2018-01-20 NOTE — PROGRESS NOTES
Problem: Falls - Risk of  Goal: *Absence of Falls  Document Kendra Fall Risk and appropriate interventions in the flowsheet. Outcome: Progressing Towards Goal  Fall Risk Interventions:Gait is steady. No falls observed. Wearing slip resistant footwear. Problem: Anxiety-Behavioral Health (Adult/Pediatric)  Goal: *STG: Remains safe in hospital  Outcome: Progressing Towards Goal  Pt denies any suicidal or homicidal thoughts. Contracts for safety. Remains on q 15 min safety checks. Patient is future oriented discussing \"desire to return to her apt and get new curtains. \"      Problem: Psychosis  Goal: *STG/LTG: Complies with medication therapy  Outcome: Progressing Towards Goal  Has been medication compliant. Problem: *Psychosis: Discharge Outcome  Goal: *Absent or Controlled Active Psychotic Symptoms  Outcome: Progressing Towards Goal  No overt psychotic symptoms observed but has multiple somatic complaints which are frequently expressed and attended to.

## 2018-01-20 NOTE — BH NOTES
GROUP THERAPY PROGRESS NOTE    Sue  is participating in Reflections. Group time: 25 minutes    Personal goal for participation: daily progress    Goal orientation: personal    Group therapy participation: active    Therapeutic interventions reviewed and discussed: \"Triggers\" and \"Resolving Conflicts\" handouts. Impression of participation: States expecting to return to apartment where she was living prior to admission. Hopes to find employment and take college courses. States also hopes to . Limited discussion of underlying issues related to hospitalization.

## 2018-01-20 NOTE — PROGRESS NOTES
Problem: Falls - Risk of  Goal: *Absence of Falls  Document Kendra Fall Risk and appropriate interventions in the flowsheet. Outcome: Progressing Towards Goal  Fall Risk Interventions:   Pt in bed asleep. NAD. No falls noted/reported. Q 15 minute checks for safety maintained.          Medication Interventions: Teach patient to arise slowly

## 2018-01-20 NOTE — PROGRESS NOTES
Problem: Anxiety-Behavioral Health (Adult/Pediatric)  Goal: *STG: Remains safe in hospital  Outcome: Progressing Towards Goal  1530:Received patient on unit in room resting quietly. Appeared in no acute distress. Will continue to monitor on Q 15 minute safety checks. Patient alert, vitals stable, wnl.   Medication and meal compliant. 2232 Patient appears to be sleeping.

## 2018-01-20 NOTE — INTERDISCIPLINARY ROUNDS
Behavioral Health Interdisciplinary Rounds     Patient Name: Raul Portillo  Age: 48 y.o.   Room/Bed:  4/  Primary Diagnosis: Bipolar 1 disorder (HCC)   Admission Status: Voluntary     Readmission within 30 days: no  Power of  in place: yes  Patient requires a blocked bed: no          Reason for blocked bed: n/a    VTE Prophylaxis: Not indicated  Flu vaccine given : yes - PTA   Mobility needs/Fall risk: no    Nutritional Plan: no  Consults:          Labs/Testing due today?: no    Sleep hours: 7.0     Participation in Care/Groups:  yes  Medication Compliant?: Yes  PRNS (last 24 hours): Pain    Restraints (last 24 hours):  no  Substance Abuse:  no  CIWA (range last 24 hours):  COWS (range last 24 hours):   Alcohol screening (AUDIT) completed -  AUDIT Score: 0  If applicable, date SBIRT discussed in treatment team AND documented:   Tobacco - patient is a smoker: no   Date tobacco education completed by RN: n/a  24 hour chart check complete: yes     Patient goal(s) for today:   Treatment team focus/goals:   Progress note     LOS:  2  Expected LOS:     Financial concerns/prescription coverage:    Date of last family contact:       Family requesting physician contact today:    Discharge plan:   Guns in the home:         Outpatient provider(s):     Participating treatment team members: Raul Portillo, * (assigned SW),

## 2018-01-21 PROCEDURE — 74011250637 HC RX REV CODE- 250/637: Performed by: PSYCHIATRY & NEUROLOGY

## 2018-01-21 PROCEDURE — 65220000003 HC RM SEMIPRIVATE PSYCH

## 2018-01-21 RX ADMIN — LITHIUM CARBONATE 900 MG: 450 TABLET, EXTENDED RELEASE ORAL at 21:15

## 2018-01-21 RX ADMIN — HYDROCHLOROTHIAZIDE 25 MG: 25 TABLET ORAL at 08:43

## 2018-01-21 RX ADMIN — LISINOPRIL 20 MG: 20 TABLET ORAL at 08:44

## 2018-01-21 RX ADMIN — HALOPERIDOL 5 MG: 5 TABLET ORAL at 21:15

## 2018-01-21 NOTE — BH NOTES
Behavioral Health Interdisciplinary Rounds     Patient Name: Ann Marie Juan  Age: 48 y.o.   Room/Bed:  4/  Primary Diagnosis: Bipolar 1 disorder (HCC)   Admission Status: Voluntary     Readmission within 30 days: no  Power of  in place:   Patient requires a blocked bed:           Reason for blocked bed:     VTE Prophylaxis: Not indicated  Flu vaccine given : yes, PTA   Mobility needs/Fall risk: no    Nutritional Plan: no  Consults:          Labs/Testing due today?: no    Sleep hours:        Participation in Care/Groups:  yes  Medication Compliant?: Yes  PRNS (last 24 hours): None    Restraints (last 24 hours):  no  Substance Abuse:  no  CIWA (range last 24 hours):  COWS (range last 24 hours):   Alcohol screening (AUDIT) completed -  AUDIT Score: 0  If applicable, date SBIRT discussed in treatment team AND documented:   Tobacco - patient is a smoker: no   Date tobacco education completed by RN:   24 hour chart check complete: yes     Patient goal(s) for today:   Treatment team focus/goals:   Progress note     LOS:  4  Expected LOS:     Financial concerns/prescription coverage:    Date of last family contact:       Family requesting physician contact today:    Discharge plan:   Guns in the home:         Outpatient provider(s):     Participating treatment team members: Ann Marie Juan, * (assigned SW),

## 2018-01-21 NOTE — BH NOTES
POSITIVE HABITS GROUP THERAPY PROGRESS NOTE    The patient Gus hernandez 48 y.o. female is participating in Positive Habits Group.     Group time: 15 minutes    Personal goal for participation: Developing daily proccesses    Goal orientation: Habit development    Group therapy participation: active    Therapeutic interventions reviewed and discussed:  Yes    Impression of participation: freeman Martin  1/20/2018  10:16 PM

## 2018-01-21 NOTE — PROGRESS NOTES
Problem: Anxiety-Behavioral Health (Adult/Pediatric)  Goal: *STG: Remains safe in hospital  Outcome: Progressing Towards Goal  1530: Greeted patient on unit in room resting quietly. Appears in no acute distress. No voiced concerns at present. Will continue to monitor on Q 15 minute safety checks.

## 2018-01-21 NOTE — BH NOTES
PSYCHIATRIC PROGRESS NOTE         Patient Name  Raul Portillo   Date of Birth 1965   [facility] [id number]   Medical Record Number  [id number]      Age  48 y.o. PCP None   Admit date:  1/17/2018    Room Number  734/01  @ [facility]   Date of Service  1/21/2018          PSYCHOTHERAPY SESSION NOTE:  Length of psychotherapy session: 15 minutes    Main condition/diagnosis/issues treated during session today, 1/21/2018 : bipolar disorder, poverty. Seeking resource. I employed Cognitive Behavioral therapy techniques, Reality-Oriented psychotherapy, as well as supportive psychotherapy in regards to various ongoing psychosocial stressors, including the following: pre-admission and current problems; housing issues; occupational issues;medical issues; and stress of hospitalization. Interpersonal relationship issues and psychodynamic conflicts explored. Attempts made to alleviate maladaptive patterns. We, also, worked on issues of denial & effects of substance dependency/use     Overall, patient is not progressing    Treatment Plan Update (reviewed an updated 1/21/2018) : I will modify psychotherapy tx plan by implementing more stress management strategies, building upon cognitive behavioral techniques, increasing coping skills, as well as shoring up psychological defenses). An extended energy and skill set was needed to engage pt in psychotherapy due to some of the following: resistiveness, complexity, negativity, confrontational nature, hostile behaviors, and/or severe abnormalities in thought processes/psychosis resulting in the loss of expressive/receptive language communication skills. E & M PROGRESS NOTE:         HISTORY       CC:  \"poor self care \"  HISTORY OF PRESENT ILLNESS/INTERVAL HISTORY:  (reviewed/updated 1/21/2018).   per initial evaluation:     Raul Portillo presents/reports/evidences the following emotional symptoms today, 1/21/2018:psychotic behavior and manic behavior. The above symptoms have been present for several days . These symptoms are of severe severity. The symptoms are constant  in nature. Additional symptomatology and features include anxiety, physical complaints. Continued reid. She says that she plans to stay on medications after discharge      SIDE EFFECTS: (reviewed/updated 1/21/2018)  None reported or admitted to. No noted toxicity with use of lithium   ALLERGIES:(reviewed/updated 1/21/2018)  No Known Allergies   MEDICATIONS PRIOR TO ADMISSION:(reviewed/updated 1/21/2018)  Prescriptions Prior to Admission   Medication Sig    lisinopril-hydroCHLOROthiazide (PRINZIDE, ZESTORETIC) 20-25 mg per tablet Take 1 Tab by mouth daily.  fish oil-omega-3 fatty acids 340-1,000 mg capsule Take 1 Cap by mouth daily.  therapeutic multivitamin (THERAGRAN) tablet Take 1 Tab by mouth daily. PAST MEDICAL HISTORY: Past medical history from the initial psychiatric evaluation has been reviewed (reviewed/updated 1/21/2018) with no additional updates (I asked patient and no additional past medical history provided). Past Medical History:   Diagnosis Date    Arthritis     Bilateral knees    Hallucination     Paranoia ([facility] [address]     Psychiatric disorder     anxiety,bipolar   No past surgical history on file. SOCIAL HISTORY: Social history from the initial psychiatric evaluation has been reviewed (reviewed/updated 1/21/2018) with no additional updates (I asked patient and no additional social history provided). Social History     Social History    Marital status: SINGLE     Spouse name: N/A    Number of children: N/A    Years of education: N/A     Occupational History    Not on file.      Social History Main Topics    Smoking status: Heavy Tobacco Smoker    Smokeless tobacco: Former User    Alcohol use No    Drug use: No    Sexual activity: Not on file     Other Topics Concern    Not on file     Social History Narrative    50year old AA female admitted on TDO for walking out of her paid apt. And stating she wants to be homeless because \"people have been following me and tazing me. \" Pt has severe financial problems and this has been a huge stressor. She is not on disability but is inn subsidized housing. She has [facility] but has not always complied due to transportation problem. FAMILY HISTORY: Family history from the initial psychiatric evaluation has been reviewed (reviewed/updated 1/21/2018) with no additional updates (I asked patient and no additional family history provided). No family history on file. REVIEW OF SYSTEMS: (reviewed/updated 1/21/2018)  Appetite:no change from normal   Sleep: decreased more than normal   All other Review of Systems: Psychological ROS: positive for - anxiety  Respiratory ROS: no cough, shortness of breath, or wheezing  Cardiovascular ROS: no chest pain or dyspnea on exertion         [address] [facility] (MSE):    MSE FINDINGS ARE WITHIN NORMAL LIMITS (WNL) UNLESS OTHERWISE STATED BELOW. ( ALL OF THE BELOW CATEGORIES OF THE MSE HAVE BEEN REVIEWED (reviewed 1/21/2018) AND UPDATED AS DEEMED APPROPRIATE )  General Presentation age appropriate, cooperative   Orientation oriented to time, place and person   Vital Signs  See below (reviewed 1/21/2018); Vital Signs (BP, Pulse, & Temp) are within normal limits if not listed below.    Gait and Station Stable/steady, no ataxia   Musculoskeletal System No extrapyramidal symptoms (EPS); no abnormal muscular movements or Tardive Dyskinesia (TD); muscle strength and tone are within normal limits   Language No aphasia or dysarthria   Speech:  normal pitch   Thought Processes concrete; normal rate of thoughts; fair abstract reasoning/computation   Thought Associations goal directed   Thought Content paranoid delusions   Suicidal Ideations none   Homicidal Ideations none   Mood:  stable    Affect:  mood-congruent   Memory recent  fair Memory remote:  fair   Concentration/Attention:  distractable   Fund of Knowledge average   Insight:  good   Reliability poor   Judgment:  limited          VITALS:     Patient Vitals for the past 24 hrs:   Temp Pulse Resp BP SpO2   01/21/18 0825 98 °F (36.7 °C) (!) 101 18 142/89 98 %   01/20/18 2114 97.3 °F (36.3 °C) 94 18 (!) 142/98 -   01/20/18 1756 97.8 °F (36.6 °C) 100 16 111/67 100 %     Wt Readings from Last 3 Encounters:   01/18/18 89.8 kg (198 lb)   01/15/18 90.7 kg (200 lb)     Temp Readings from Last 3 Encounters:   01/21/18 98 °F (36.7 °C)   01/15/18 98.6 °F (37 °C)   10/11/17 98.5 °F (36.9 °C)     BP Readings from Last 3 Encounters:   01/21/18 142/89   01/15/18 132/67   10/11/17 142/78     Pulse Readings from Last 3 Encounters:   01/21/18 (!) 101   01/15/18 80   10/11/17 70            DATA     LABORATORY DATA:(reviewed/updated 1/21/2018)  No results found for this or any previous visit (from the past 24 hour(s)). No results found for: VALF2, VALAC, VALP, VALPR, DS6, CRBAM, CRBAMP, CARB2, XCRBAM  Lab Results   Component Value Date/Time    Lithium level <0.20 01/17/2018 03:09 PM      RADIOLOGY REPORTS:(reviewed/updated 1/21/2018)  No results found.        MEDICATIONS     ALL MEDICATIONS:   Current Facility-Administered Medications   Medication Dose Route Frequency    tiZANidine (ZANAFLEX) tablet 2 mg  2 mg Oral Q6H PRN    haloperidol (HALDOL) tablet 5 mg  5 mg Oral QHS    lithium carbonate CR (ESKALITH CR) tablet 900 mg  900 mg Oral QHS    zolpidem (AMBIEN) tablet 10 mg  10 mg Oral QHS PRN    lisinopril (PRINIVIL, ZESTRIL) tablet 20 mg  20 mg Oral DAILY    hydroCHLOROthiazide (HYDRODIURIL) tablet 25 mg  25 mg Oral DAILY    ziprasidone (GEODON) 20 mg in sterile water (preservative free) 1 mL injection  20 mg IntraMUSCular BID PRN    OLANZapine (ZyPREXA) tablet 5 mg  5 mg Oral Q6H PRN    benztropine (COGENTIN) tablet 2 mg  2 mg Oral BID PRN    benztropine (COGENTIN) injection 2 mg  2 mg IntraMUSCular BID PRN    LORazepam (ATIVAN) injection 2 mg  2 mg IntraMUSCular Q4H PRN    LORazepam (ATIVAN) tablet 1 mg  1 mg Oral Q4H PRN    acetaminophen (TYLENOL) tablet 650 mg  650 mg Oral Q4H PRN    ibuprofen (MOTRIN) tablet 400 mg  400 mg Oral Q8H PRN    magnesium hydroxide (MILK OF MAGNESIA) 400 mg/5 mL oral suspension 30 mL  30 mL Oral DAILY PRN    nicotine (NICODERM CQ) 21 mg/24 hr patch 1 Patch  1 Patch TransDERmal DAILY PRN      SCHEDULED MEDICATIONS:   Current Facility-Administered Medications   Medication Dose Route Frequency    haloperidol (HALDOL) tablet 5 mg  5 mg Oral QHS    lithium carbonate CR (ESKALITH CR) tablet 900 mg  900 mg Oral QHS    lisinopril (PRINIVIL, ZESTRIL) tablet 20 mg  20 mg Oral DAILY    hydroCHLOROthiazide (HYDRODIURIL) tablet 25 mg  25 mg Oral DAILY          ASSESSMENT & PLAN     DIAGNOSES REQUIRING ACTIVE TREATMENT AND MONITORING: (reviewed/updated 1/21/2018)  Patient Active Hospital Problem List:   Bipolar 1 disorder ([facility] (1/17/2018)    Assessment: reid alternating with depression     Plan: lithum and haldol  Monitor lithium level      Back pain  Assessment: muscle sprain  Plan: Tizanidine            I will continue to monitor blood levels (Depakote, Tegretol, lithium, clozapine---a drug with a narrow therapeutic index= NTI) and associated labs for drug therapy implemented that require intense monitoring for toxicity as deemed appropriate based on current medication side effects and pharmacodynamically determined drug 1/2 lives. In summary, Eliezer Mosquera, is a 48 y.o.  female who presents with a severe exacerbation of the principal diagnosis of Bipolar 1 disorder ([facility]  Patient's condition is worsening/not improving/not stable . Patient requires continued inpatient hospitalization for further stabilization, safety monitoring and medication management.   I will continue to coordinate the provision of individual, milieu, occupational, group, and substance abuse therapies to address target symptoms/diagnoses as deemed appropriate for the individual patient. A coordinated, multidisplinary treatment team round was conducted with the patient (this team consists of the nurse, psychiatric unit pharmcist,  and writer). Complete current electronic health record for patient has been reviewed today including consultant notes, ancillary staff notes, nurses and psychiatric tech notes. Suicide risk assessment completed and patient deemed to be of low risk for suicide at this time. The following regarding medications was addressed during rounds with patient:   the risks and benefits of the proposed medication. The patient was given the opportunity to ask questions. Informed consent given to the use of the above medications. Will continue to adjust psychiatric and non-psychiatric medications (see above \"medication\" section and orders section for details) as deemed appropriate & based upon diagnoses and response to treatment. I will continue to order blood tests/labs and diagnostic tests as deemed appropriate and review results as they become available (see orders for details and above listed lab/test results). I will order psychiatric records from previous [facility] hospitals to further elucidate the nature of patient's psychopathology and review once available. I will gather additional collateral information from friends, family and o/p treatment team to further elucidate the nature of patient's psychopathology and baselline level of psychiatric functioning.          I certify that this patient's inpatient psychiatric hospital services furnished since the previous certification were, and continue to be, required for treatment that could reasonably be expected to improve the patient's condition, or for diagnostic study, and that the patient continues to need, on a daily basis, active treatment furnished directly by or requiring the supervision of inpatient psychiatric facility personnel. In addition, the hospital records show that services furnished were intensive treatment services, admission or related services, or equivalent services.     EXPECTED DISCHARGE DATE/DAY: TBD     DISPOSITION: Home       Signed By:   Don Gonzalez MD  1/21/2018

## 2018-01-21 NOTE — PROGRESS NOTES
Problem: Suicide/Homicide (Adult/Pediatric)  Goal: *STG:  Verbalizes alternative ways of dealing with maladaptive feelings/behaviors  Outcome: Progressing Towards Goal  Pt reports doing chair exercises and reading the bible help to decrease anxiety, pt is in room reading bible at present

## 2018-01-21 NOTE — PROGRESS NOTES
Problem: Falls - Risk of  Goal: *Absence of Falls  Document Kendra Fall Risk and appropriate interventions in the flowsheet. Outcome: Progressing Towards Goal  Fall Risk Interventions:   patient asleep in bed. NAD. No fall noted. Continue to monitor q 15 minutes for safety.          Medication Interventions: Teach patient to arise slowly

## 2018-01-21 NOTE — BH NOTES
GROUP THERAPY PROGRESS NOTE    Rakel Corona is participating in [place]. Group time: 15 minutes    Personal goal for participation:   To be more independent    Goal orientation: community    Group therapy participation: active    Therapeutic interventions reviewed and discussed: Review of unit guidelines and setting a daily goal.    Impression of participation: active

## 2018-01-22 PROCEDURE — 82670 ASSAY OF TOTAL ESTRADIOL: CPT | Performed by: PSYCHIATRY & NEUROLOGY

## 2018-01-22 PROCEDURE — 74011250637 HC RX REV CODE- 250/637: Performed by: PSYCHIATRY & NEUROLOGY

## 2018-01-22 PROCEDURE — 83001 ASSAY OF GONADOTROPIN (FSH): CPT | Performed by: PSYCHIATRY & NEUROLOGY

## 2018-01-22 PROCEDURE — 36415 COLL VENOUS BLD VENIPUNCTURE: CPT | Performed by: PSYCHIATRY & NEUROLOGY

## 2018-01-22 PROCEDURE — 84144 ASSAY OF PROGESTERONE: CPT | Performed by: PSYCHIATRY & NEUROLOGY

## 2018-01-22 PROCEDURE — 65220000003 HC RM SEMIPRIVATE PSYCH

## 2018-01-22 RX ORDER — HALOPERIDOL 2 MG/ML
5 SOLUTION ORAL
Status: DISCONTINUED | OUTPATIENT
Start: 2018-01-22 | End: 2018-01-23

## 2018-01-22 RX ORDER — LITHIUM 8 MEQ/5ML
900 SOLUTION ORAL
Status: DISCONTINUED | OUTPATIENT
Start: 2018-01-22 | End: 2018-01-24

## 2018-01-22 RX ADMIN — LISINOPRIL 20 MG: 20 TABLET ORAL at 08:37

## 2018-01-22 RX ADMIN — HYDROCHLOROTHIAZIDE 25 MG: 25 TABLET ORAL at 08:37

## 2018-01-22 RX ADMIN — IBUPROFEN 400 MG: 400 TABLET, FILM COATED ORAL at 17:17

## 2018-01-22 RX ADMIN — HALOPERIDOL 5 MG: 2 SOLUTION ORAL at 21:12

## 2018-01-22 RX ADMIN — LITHIUM 900 MG: 8 SOLUTION ORAL at 21:13

## 2018-01-22 NOTE — PROGRESS NOTES
Problem: Falls - Risk of  Goal: *Absence of Falls  Document Kendra Fall Risk and appropriate interventions in the flowsheet.    Outcome: Progressing Towards Goal  Fall Risk Interventions:            Medication Interventions: Teach patient to arise slowly

## 2018-01-22 NOTE — BH NOTES
GROUP THERAPY PROGRESS NOTE    Jeana Bolden partially participated in the Acute Unit's afternoon Process Group, with a focus on identifying feelings,   planning for the rest of the day, and preparing for discharge. Group time: 50 minutes. Personal goal for participation: To increase the capacity to improve ones mood and structure. Goal orientation: The patient will be able to identify their feelings, develop a plan for structuring their day,   and discharge planning. Group therapy participation: This patient joined the group about fifteen minutes after the group started. She contributed to the group discussion in an appropriate fashion during the session. Therapeutic interventions reviewed and discussed: The group members were asked to introduce   themselves to each other and to see if they could identify an emotion they are having and/or let the group   know what they want to focus on for the day as they continue to make discharge plans. Impression of participation: The patient said she was feeling \"fine\" and then shared how she wanted to   work on her being able to continue living indepently in an apartment of her own. She knew that she would  need to apply for jobs when she leaves the hospital and that she would probably be able to use some  assistance in putting her resume together. [She may need another referral to the Department of Rehabilitation?]  The patient expressed no current SI/HI and displayed no overt psychotic symptoms in this group. She said   she was grateful her landlady would hold her apartment for her. Her affect was anxious and her mood reflected  her affect. She also talked more about how difficult it is for her to trust others, but that she continues to   feel supported appropriately here on the unit. Her thinking is concrete and she apparently benefits from  reality focused interventions.

## 2018-01-22 NOTE — PROGRESS NOTES
Problem: Anxiety-Behavioral Health (Adult/Pediatric)  Goal: *STG: Remains safe in hospital  Outcome: Progressing Towards Goal  Pt remains safe in the hospital. Continued on Q 15 minute safety checks. Goal: *STG: Attends activities and groups  Outcome: Progressing Towards Goal  Encouraged pt to attend groups and express feelings. Goal: *STG/LTG: Complies with medication therapy  Outcome: Progressing Towards Goal  Took medications as scheduled in the morning.

## 2018-01-22 NOTE — PROGRESS NOTES
Problem: Falls - Risk of  Goal: *Absence of Falls  Document Kendra Fall Risk and appropriate interventions in the flowsheet. Outcome: Progressing Towards Goal  Fall Risk Interventions:    Medication Interventions: Teach patient to arise slowly    2340 Pt appears asleep in bed. Respirations even and unlabored. Bathroom light on, door remains open. Will continue to monitor with Q 15 safety checks.

## 2018-01-22 NOTE — BH NOTES
PRN Medication Documentation    Specific patient behavior that led to need for PRN medication: pain in right arm  Staff interventions attempted prior to PRN being given: relaxation, decrease in stimuli, etc.   PRN medication given:  mg of motrin  Patient response/effectiveness of PRN medication: will reassess within 1 hr

## 2018-01-22 NOTE — INTERDISCIPLINARY ROUNDS
Behavioral Health Interdisciplinary Rounds     Patient Name: Meagan Jack  Age: 48 y.o. Room/Bed:  734/  Primary Diagnosis: Bipolar 1 disorder (HCC)   Admission Status: Voluntary     Readmission within 30 days: no  Power of  in place: no  Patient requires a blocked bed: no          Reason for blocked bed: n/a    VTE Prophylaxis: Not indicated  Flu vaccine given : yes - PTA  Mobility needs/Fall risk: no    Nutritional Plan: no  Consults: no         Labs/Testing due today?: no    Sleep hours: 7.25       Participation in Care/Groups:  yes  Medication Compliant?: Yes  PRNS (last 24 hours): None    Restraints (last 24 hours):  no  Substance Abuse:  no  CIWA (range last 24 hours):  COWS (range last 24 hours):   Alcohol screening (AUDIT) completed -  AUDIT Score: 0  If applicable, date SBIRT discussed in treatment team AND documented:   Tobacco - patient is a smoker: no   Date tobacco education completed by RN: n/a  24 hour chart check complete: yes     Patient goal(s) for today: Continue to engage in unit activities  Treatment team focus/goals: Continue medication management  Progress note Patient remains religiously preoccupied but was appropriate in her presentation.     LOS:  5  Expected LOS: TBD    Financial concerns/prescription coverage:    Date of last family contact:  Caty Salgado, son, [phone]    Family requesting physician contact today:    Discharge plan: Return to home  Guns in the home:  No       Outpatient provider(s):  Dr. Izabel Curtis; Candie Hagen; Hong Martines, PharmD, Ana Kang    Participating treatment team members: Meagan Jack, * (assigned SW),

## 2018-01-22 NOTE — BH NOTES
PSYCHIATRIC PROGRESS NOTE         Patient Name  Ann Marie Juan   Date of Birth 1965   [facility] [id number]   Medical Record Number  [id number]      Age  48 y.o. PCP None   Admit date:  1/17/2018    Room Number  734/01  @ [facility]   Date of Service  1/22/2018          PSYCHOTHERAPY SESSION NOTE:  Length of psychotherapy session: 15 minutes    Main condition/diagnosis/issues treated during session today, 1/22/2018 : bipolar disorder, poverty. Seeking resource. I employed Cognitive Behavioral therapy techniques, Reality-Oriented psychotherapy, as well as supportive psychotherapy in regards to various ongoing psychosocial stressors, including the following: pre-admission and current problems; housing issues; occupational issues;medical issues; and stress of hospitalization. Interpersonal relationship issues and psychodynamic conflicts explored. Attempts made to alleviate maladaptive patterns. We, also, worked on issues of denial & effects of substance dependency/use     Overall, patient is not progressing    Treatment Plan Update (reviewed an updated 1/22/2018) : I will modify psychotherapy tx plan by implementing more stress management strategies, building upon cognitive behavioral techniques, increasing coping skills, as well as shoring up psychological defenses). An extended energy and skill set was needed to engage pt in psychotherapy due to some of the following: resistiveness, complexity, negativity, confrontational nature, hostile behaviors, and/or severe abnormalities in thought processes/psychosis resulting in the loss of expressive/receptive language communication skills. E & M PROGRESS NOTE:         HISTORY       CC:  \"poor self care \"  HISTORY OF PRESENT ILLNESS/INTERVAL HISTORY:  (reviewed/updated 1/22/2018).   per initial evaluation:     Ann Marie Juan presents/reports/evidences the following emotional symptoms today, 1/22/2018:psychotic behavior and manic behavior. The above symptoms have been present for several days . These symptoms are of severe severity. The symptoms are constant  in nature. Additional symptomatology and features include anxiety, physical complaints. Continued reid. She says that she plans to stay on medications after discharge    1/22/18- Repetative, chatty with mildly pressured speech. Poor insight . thnking still disordered                SIDE EFFECTS: (reviewed/updated 1/22/2018)  None reported or admitted to. No noted toxicity with use of lithium   ALLERGIES:(reviewed/updated 1/22/2018)  No Known Allergies   MEDICATIONS PRIOR TO ADMISSION:(reviewed/updated 1/22/2018)  Prescriptions Prior to Admission   Medication Sig    lisinopril-hydroCHLOROthiazide (PRINZIDE, ZESTORETIC) 20-25 mg per tablet Take 1 Tab by mouth daily.  fish oil-omega-3 fatty acids 340-1,000 mg capsule Take 1 Cap by mouth daily.  therapeutic multivitamin (THERAGRAN) tablet Take 1 Tab by mouth daily. PAST MEDICAL HISTORY: Past medical history from the initial psychiatric evaluation has been reviewed (reviewed/updated 1/22/2018) with no additional updates (I asked patient and no additional past medical history provided). Past Medical History:   Diagnosis Date    Arthritis     Bilateral knees    Hallucination     Paranoia ([facility]     Psychiatric disorder     anxiety,bipolar   No past surgical history on file. SOCIAL HISTORY: Social history from the initial psychiatric evaluation has been reviewed (reviewed/updated 1/22/2018) with no additional updates (I asked patient and no additional social history provided). Social History     Social History    Marital status: SINGLE     Spouse name: N/A    Number of children: N/A    Years of education: N/A     Occupational History    Not on file.      Social History Main Topics    Smoking status: Heavy Tobacco Smoker    Smokeless tobacco: Former User    Alcohol use No    Drug use: No    Sexual activity: Not on file     Other Topics Concern    Not on file     Social History Narrative    50year old AA female admitted on TDO for walking out of her paid apt. And stating she wants to be homeless because \"people have been following me and tazing me. \" Pt has severe financial problems and this has been a huge stressor. She is not on disability but is inn subsidized housing. She [facility] but has not always complied due to transportation problem. FAMILY HISTORY: Family history from the initial psychiatric evaluation has been reviewed (reviewed/updated 1/22/2018) with no additional updates (I asked patient and no additional family history provided). No family history on file. REVIEW OF SYSTEMS: (reviewed/updated 1/22/2018)  Appetite:no change from normal   Sleep: decreased more than normal   All other Review of Systems: Psychological ROS: positive for - anxiety  Respiratory ROS: no cough, shortness of breath, or wheezing  Cardiovascular ROS: no chest pain or dyspnea on exertion         [facility] (MSE):    MSE FINDINGS ARE WITHIN NORMAL LIMITS (WNL) UNLESS OTHERWISE STATED BELOW. ( ALL OF THE BELOW CATEGORIES OF THE MSE HAVE BEEN REVIEWED (reviewed 1/22/2018) AND UPDATED AS DEEMED APPROPRIATE )  General Presentation age appropriate, cooperative   Orientation oriented to time, place and person   Vital Signs  See below (reviewed 1/22/2018); Vital Signs (BP, Pulse, & Temp) are within normal limits if not listed below.    Gait and Station Stable/steady, no ataxia   Musculoskeletal System No extrapyramidal symptoms (EPS); no abnormal muscular movements or Tardive Dyskinesia (TD); muscle strength and tone are within normal limits   Language No aphasia or dysarthria   Speech:  normal pitch   Thought Processes concrete; normal rate of thoughts; fair abstract reasoning/computation   Thought Associations goal directed   Thought Content paranoid delusions Suicidal Ideations none   Homicidal Ideations none   Mood:  stable    Affect:  mood-congruent   Memory recent  fair   Memory remote:  fair   Concentration/Attention:  distractable   Fund of Knowledge average   Insight:  good   Reliability poor   Judgment:  limited          VITALS:     Patient Vitals for the past 24 hrs:   Temp Pulse Resp BP   01/22/18 0835 98.9 °F (37.2 °C) 92 16 126/87   01/21/18 2000 98.4 °F (36.9 °C) (!) 102 16 145/76     Wt Readings from Last 3 Encounters:   01/18/18 89.8 kg (198 lb)   01/15/18 90.7 kg (200 lb)     Temp Readings from Last 3 Encounters:   01/22/18 98.9 °F (37.2 °C)   01/15/18 98.6 °F (37 °C)   10/11/17 98.5 °F (36.9 °C)     BP Readings from Last 3 Encounters:   01/22/18 126/87   01/15/18 132/67   10/11/17 142/78     Pulse Readings from Last 3 Encounters:   01/22/18 92   01/15/18 80   10/11/17 70            DATA     LABORATORY DATA:(reviewed/updated 1/22/2018)  No results found for this or any previous visit (from the past 24 hour(s)). No results found for: VALF2, VALAC, VALP, VALPR, DS6, CRBAM, CRBAMP, CARB2, XCRBAM  Lab Results   Component Value Date/Time    Lithium level <0.20 01/17/2018 03:09 PM      RADIOLOGY REPORTS:(reviewed/updated 1/22/2018)  No results found.        MEDICATIONS     ALL MEDICATIONS:   Current Facility-Administered Medications   Medication Dose Route Frequency    lithium citrate 8 mEq/5 mL (5 mL) oral solution 900 mg  900 mg Oral QHS    haloperidol (HALDOL) 2 mg/mL oral solution 5 mg  5 mg Oral QHS    tiZANidine (ZANAFLEX) tablet 2 mg  2 mg Oral Q6H PRN    zolpidem (AMBIEN) tablet 10 mg  10 mg Oral QHS PRN    lisinopril (PRINIVIL, ZESTRIL) tablet 20 mg  20 mg Oral DAILY    hydroCHLOROthiazide (HYDRODIURIL) tablet 25 mg  25 mg Oral DAILY    ziprasidone (GEODON) 20 mg in sterile water (preservative free) 1 mL injection  20 mg IntraMUSCular BID PRN    OLANZapine (ZyPREXA) tablet 5 mg  5 mg Oral Q6H PRN    benztropine (COGENTIN) tablet 2 mg  2 mg Oral BID PRN    benztropine (COGENTIN) injection 2 mg  2 mg IntraMUSCular BID PRN    LORazepam (ATIVAN) injection 2 mg  2 mg IntraMUSCular Q4H PRN    LORazepam (ATIVAN) tablet 1 mg  1 mg Oral Q4H PRN    acetaminophen (TYLENOL) tablet 650 mg  650 mg Oral Q4H PRN    ibuprofen (MOTRIN) tablet 400 mg  400 mg Oral Q8H PRN    magnesium hydroxide (MILK OF MAGNESIA) 400 mg/5 mL oral suspension 30 mL  30 mL Oral DAILY PRN    nicotine (NICODERM CQ) 21 mg/24 hr patch 1 Patch  1 Patch TransDERmal DAILY PRN      SCHEDULED MEDICATIONS:   Current Facility-Administered Medications   Medication Dose Route Frequency    lithium citrate 8 mEq/5 mL (5 mL) oral solution 900 mg  900 mg Oral QHS    haloperidol (HALDOL) 2 mg/mL oral solution 5 mg  5 mg Oral QHS    lisinopril (PRINIVIL, ZESTRIL) tablet 20 mg  20 mg Oral DAILY    hydroCHLOROthiazide (HYDRODIURIL) tablet 25 mg  25 mg Oral DAILY          ASSESSMENT & PLAN     DIAGNOSES REQUIRING ACTIVE TREATMENT AND MONITORING: (reviewed/updated 1/22/2018)  Patient Active Hospital Problem List:   Bipolar 1 disorder ([facility] (1/17/2018)    Assessment: reid alternating with depression     Plan: lithum and haldol  Monitor lithium level      Back pain  Assessment: muscle sprain  Plan: Tizanidine            I will continue to monitor blood levels (Depakote, Tegretol, lithium, clozapine---a drug with a narrow therapeutic index= NTI) and associated labs for drug therapy implemented that require intense monitoring for toxicity as deemed appropriate based on current medication side effects and pharmacodynamically determined drug 1/2 lives. In summary, Gus Schultz, is a 48 y.o.  female who presents with a severe exacerbation of the principal diagnosis of Bipolar 1 disorder ([facility]  Patient's condition is worsening/not improving/not stable . Patient requires continued inpatient hospitalization for further stabilization, safety monitoring and medication management.   I will continue to coordinate the provision of individual, milieu, occupational, group, and substance abuse therapies to address target symptoms/diagnoses as deemed appropriate for the individual patient. A coordinated, multidisplinary treatment team round was conducted with the patient (this team consists of the nurse, psychiatric unit pharmcist,  and writer). Complete current electronic health record for patient has been reviewed today including consultant notes, ancillary staff notes, nurses and psychiatric tech notes. Suicide risk assessment completed and patient deemed to be of low risk for suicide at this time. The following regarding medications was addressed during rounds with patient:   the risks and benefits of the proposed medication. The patient was given the opportunity to ask questions. Informed consent given to the use of the above medications. Will continue to adjust psychiatric and non-psychiatric medications (see above \"medication\" section and orders section for details) as deemed appropriate & based upon diagnoses and response to treatment. I will continue to order blood tests/labs and diagnostic tests as deemed appropriate and review results as they become available (see orders for details and above listed lab/test results). I will order psychiatric records from previous [facility] hospitals to further elucidate the nature of patient's psychopathology and review once available. I will gather additional collateral information from friends, family and o/p treatment team to further elucidate the nature of patient's psychopathology and baselline level of psychiatric functioning.          I certify that this patient's inpatient psychiatric hospital services furnished since the previous certification were, and continue to be, required for treatment that could reasonably be expected to improve the patient's condition, or for diagnostic study, and that the patient continues to need, on a daily basis, active treatment furnished directly by or requiring the supervision of inpatient psychiatric facility personnel. In addition, the hospital records show that services furnished were intensive treatment services, admission or related services, or equivalent services.     EXPECTED DISCHARGE DATE/DAY: TBD     DISPOSITION: Home       Signed By:   Shannon Christian MD  1/22/2018

## 2018-01-23 LAB
ALBUMIN SERPL-MCNC: 3.5 G/DL (ref 3.5–5)
ALBUMIN/GLOB SERPL: 0.7 {RATIO} (ref 1.1–2.2)
ALP SERPL-CCNC: 80 U/L (ref 45–117)
ALT SERPL-CCNC: 29 U/L (ref 12–78)
ANION GAP SERPL CALC-SCNC: 7 MMOL/L (ref 5–15)
AST SERPL-CCNC: 14 U/L (ref 15–37)
BILIRUB SERPL-MCNC: 0.4 MG/DL (ref 0.2–1)
BUN SERPL-MCNC: 17 MG/DL (ref 6–20)
BUN/CREAT SERPL: 17 (ref 12–20)
CALCIUM SERPL-MCNC: 9.8 MG/DL (ref 8.5–10.1)
CHLORIDE SERPL-SCNC: 101 MMOL/L (ref 97–108)
CHOLEST SERPL-MCNC: 221 MG/DL
CO2 SERPL-SCNC: 26 MMOL/L (ref 21–32)
CREAT SERPL-MCNC: 0.99 MG/DL (ref 0.55–1.02)
DATE LAST DOSE: NORMAL
GLOBULIN SER CALC-MCNC: 4.7 G/DL (ref 2–4)
GLUCOSE SERPL-MCNC: 102 MG/DL (ref 65–100)
HDLC SERPL-MCNC: 43 MG/DL
HDLC SERPL: 5.1 {RATIO} (ref 0–5)
LDLC SERPL CALC-MCNC: 151.2 MG/DL (ref 0–100)
LIPID PROFILE,FLP: ABNORMAL
LITHIUM SERPL-SCNC: 0.83 MMOL/L (ref 0.6–1.2)
POTASSIUM SERPL-SCNC: 4 MMOL/L (ref 3.5–5.1)
PROT SERPL-MCNC: 8.2 G/DL (ref 6.4–8.2)
REPORTED DOSE,DOSE: NORMAL UNITS
REPORTED DOSE/TIME,TMG: NORMAL
SODIUM SERPL-SCNC: 134 MMOL/L (ref 136–145)
TRIGL SERPL-MCNC: 134 MG/DL (ref ?–150)
VLDLC SERPL CALC-MCNC: 26.8 MG/DL

## 2018-01-23 PROCEDURE — 80053 COMPREHEN METABOLIC PANEL: CPT | Performed by: PSYCHIATRY & NEUROLOGY

## 2018-01-23 PROCEDURE — 65220000003 HC RM SEMIPRIVATE PSYCH

## 2018-01-23 PROCEDURE — 36415 COLL VENOUS BLD VENIPUNCTURE: CPT | Performed by: PSYCHIATRY & NEUROLOGY

## 2018-01-23 PROCEDURE — 80178 ASSAY OF LITHIUM: CPT | Performed by: PSYCHIATRY & NEUROLOGY

## 2018-01-23 PROCEDURE — 74011250637 HC RX REV CODE- 250/637: Performed by: PSYCHIATRY & NEUROLOGY

## 2018-01-23 PROCEDURE — 80061 LIPID PANEL: CPT | Performed by: PSYCHIATRY & NEUROLOGY

## 2018-01-23 RX ORDER — HALOPERIDOL 2 MG/ML
10 SOLUTION ORAL
Status: DISCONTINUED | OUTPATIENT
Start: 2018-01-23 | End: 2018-01-24

## 2018-01-23 RX ADMIN — HYDROCHLOROTHIAZIDE 25 MG: 25 TABLET ORAL at 08:17

## 2018-01-23 RX ADMIN — LITHIUM 900 MG: 8 SOLUTION ORAL at 21:43

## 2018-01-23 RX ADMIN — IBUPROFEN 400 MG: 400 TABLET, FILM COATED ORAL at 12:28

## 2018-01-23 RX ADMIN — Medication 1 CAPSULE: at 12:44

## 2018-01-23 RX ADMIN — LISINOPRIL 20 MG: 20 TABLET ORAL at 08:17

## 2018-01-23 RX ADMIN — HALOPERIDOL 10 MG: 2 SOLUTION ORAL at 21:43

## 2018-01-23 NOTE — PROGRESS NOTES
Problem: Falls - Risk of  Goal: *Absence of Falls  Document Kendra Fall Risk and appropriate interventions in the flowsheet. Outcome: Progressing Towards Goal  Fall Risk Interventions:  Patient has been fall free since arriving on the acute unit. Patient currently sleeping, no stress noted.             Medication Interventions: Teach patient to arise slowly

## 2018-01-23 NOTE — INTERDISCIPLINARY ROUNDS
Behavioral Health Interdisciplinary Rounds     Patient Name: Jose Franklin  Age: 48 y.o. Room/Bed:  734/  Primary Diagnosis: Bipolar 1 disorder ([facility]   Admission Status: Voluntary     Readmission within 30 days: no  Power of  in place: no  Patient requires a blocked bed: no          Reason for blocked bed:     VTE Prophylaxis: No  Flu vaccine given : no   Mobility needs/Fall risk: no    Nutritional Plan: no  Consults:          Labs/Testing due today?: no    Sleep hours:  5.30  . Participation in Care/Groups:  yes  Medication Compliant?: Yes  PRNS (last 24 hours): None    Restraints (last 24 hours):  no  Substance Abuse:    CIWA (range last 24 hours):  COWS (range last 24 hours):   Alcohol screening (AUDIT) completed -  AUDIT Score: 0  If applicable, date SBIRT discussed in treatment team AND documented:   Tobacco - patient is a smoker:    Date tobacco education completed by RN:   24 hour chart check complete: yes     Patient goal(s) for today:   Treatment team focus/goals: Plan to discuss discharge with her . Progress note She has been doing well on the unit . Level is therapeutic     LOS:  6  Expected LOS: TBD    Financial concerns/prescription coverage:  no  Date of last family contact:  SW to talk to her son today. Family requesting physician contact today:  no  Discharge plan: she will return home when ready for discharge. Guns in the home:  no       Outpatient provider(s): Refer to [address] [place] [address] Pky     Participating treatment team members: Chasity Hodge, PharmD.

## 2018-01-23 NOTE — BH NOTES
GROUP THERAPY PROGRESS NOTE    The patient Raul hernandez 48 y.o. female is participating in Nutritional Group.     Group time: 30 minutes    Personal goal for participation: Daily goal setting    Goal orientation: personal    Group therapy participation: active    Therapeutic interventions reviewed and discussed:  Yes    Impression of participation:     Lima Crum  1/23/2018  10:46 AM

## 2018-01-23 NOTE — PROGRESS NOTES
Problem: Psychosis  Goal: *STG: Remains safe in hospital  Outcome: Progressing Towards Goal  Patient is sitting quietly in her room. Alert, calm and cooperative.   Patient remains safe in hospital.

## 2018-01-23 NOTE — PROGRESS NOTES
Laboratory Monitoring for Antipsychotics: This patient is currently prescribed the following medication(s):   Current Facility-Administered Medications   Medication Dose Route Frequency    fish oil-omega-3 fatty acids 340-1,000 mg capsule 1 Cap  1 Cap Oral DAILY    lithium citrate 8 mEq/5 mL (5 mL) oral solution 900 mg  900 mg Oral QHS    haloperidol (HALDOL) 2 mg/mL oral solution 5 mg  5 mg Oral QHS    lisinopril (PRINIVIL, ZESTRIL) tablet 20 mg  20 mg Oral DAILY    hydroCHLOROthiazide (HYDRODIURIL) tablet 25 mg  25 mg Oral DAILY     The following labs have been completed for monitoring of antipsychotics and/or mood stabilizers:    Height, Weight, BMI Estimation  Estimated body mass index is 26.85 kg/(m^2) as calculated from the following:    Height as of 1/15/18: 182.9 cm (72\"). Weight as of this encounter: 89.8 kg (198 lb). Vital Signs/Blood Pressure  Visit Vitals    /84    Pulse 97    Temp 98 °F (36.7 °C)    Resp 16    Wt 89.8 kg (198 lb)    SpO2 98%    Breastfeeding No    BMI 26.85 kg/m2     Renal Function, Hepatic Function and Chemistry  Estimated Creatinine Clearance: 84.7 mL/min (based on Cr of 0.99). Lab Results   Component Value Date/Time    Sodium 134 01/23/2018 05:58 AM    Potassium 4.0 01/23/2018 05:58 AM    Chloride 101 01/23/2018 05:58 AM    CO2 26 01/23/2018 05:58 AM    Anion gap 7 01/23/2018 05:58 AM    BUN 17 01/23/2018 05:58 AM    Creatinine 0.99 01/23/2018 05:58 AM    BUN/Creatinine ratio 17 01/23/2018 05:58 AM    Bilirubin, total 0.4 01/23/2018 05:58 AM    Protein, total 8.2 01/23/2018 05:58 AM    Albumin 3.5 01/23/2018 05:58 AM    Globulin 4.7 01/23/2018 05:58 AM    A-G Ratio 0.7 01/23/2018 05:58 AM    ALT (SGPT) 29 01/23/2018 05:58 AM    Alk.  phosphatase 80 01/23/2018 05:58 AM     Lab Results   Component Value Date/Time    Glucose 102 01/23/2018 05:58 AM     No results found for: HBA1C, HGBE8, QXQ4NUJY    Hematology  Lab Results   Component Value Date/Time WBC 8.2 01/17/2018 03:09 PM    RBC 4.06 01/17/2018 03:09 PM    HGB 11.3 01/17/2018 03:09 PM    HCT 34.7 01/17/2018 03:09 PM    MCV 85.5 01/17/2018 03:09 PM    MCH 27.8 01/17/2018 03:09 PM    MCHC 32.6 01/17/2018 03:09 PM    RDW 13.8 01/17/2018 03:09 PM    PLATELET 068 23/48/9264 03:09 PM     Lipids  Lab Results   Component Value Date/Time    Cholesterol, total 221 01/23/2018 05:58 AM    HDL Cholesterol 43 01/23/2018 05:58 AM    LDL, calculated 151.2 01/23/2018 05:58 AM    Triglyceride 134 01/23/2018 05:58 AM    CHOL/HDL Ratio 5.1 01/23/2018 05:58 AM     Thyroid Function  Lab Results   Component Value Date/Time    TSH 1.03 01/17/2018 03:09 PM     Pregnancy Status  Lab Results   Component Value Date/Time    HCG urine, QL NEGATIVE  01/17/2018 05:30 PM     Assessment/Plan:  Recommended baseline laboratory monitoring has been completed based on this patient's current medication regimen. The patients ASCVD 10-year risk was estimated to 6.9%. As this value is less than 7.5%, would not recommend starting a statin at this time.      Conchita Wayne, PharmD, [facility], [facility]ist, [address] [place] [address]

## 2018-01-23 NOTE — PROGRESS NOTES
Problem: Psychosis  Goal: *STG: Remains safe in hospital  Outcome: Progressing Towards Goal  Pt remains safe in the hospital. Continued on Q 15 minute safety checks. Visible on the unit and interacting with peers. Goal: *STG/LTG: Complies with medication therapy  Outcome: Progressing Towards Goal  Took scheduled medications in the morning. Problem: *Psychosis: Discharge Outcome  Goal: *Absent or Controlled Active Psychotic Symptoms  Outcome: Progressing Towards Goal  Pt calm and cooperative on the unit.

## 2018-01-23 NOTE — BH NOTES
GROUP THERAPY PROGRESS NOTE    Rakel Corona participated in the Acute Unit's afternoon Process Group, with a focus on identifying feelings,   planning for the rest of the day, and preparing for discharge. Group time: 35 minutes. Personal goal for participation: To increase the capacity to improve ones mood and structure. Goal orientation: The patient will be able to identify their feelings, develop a plan for structuring their day,   and discharge planning. Group therapy participation: With prompting, this patient participated in the group. Therapeutic interventions reviewed and discussed: The group members were asked to introduce   themselves to each other and to see if they could identify an emotion they are having and/or let the group   know what they want to focus on for the day as they continue to make discharge plans. Impression of participation: The patient said was still focused on being \"independent. Riya García I don't feel like  I need to rely on anyone but the [address] \" She was asked to consider that being \"independent\" requires not  having to solve every problem by oneself but to know when it is OK to be inter-dependent and to call on  people in her life that she trusts -- that she can also rely on for advice when she encounters a problem   she cannot solve it on her own. It was also suggested, based on what she shared about her crisis  before coming into the hospital, that she might try to recognize when her fear begins to rise, before  it causes her to run. She was encouraged to develop a safety list with phone numbers of resources she   can call to talk to when she begins to feel afraid. She responded by saying her 29year old son. Her thinking appears very concrete, prideful, and mistrustful. The patient expressed no SI/HI and   displayed no overt psychotic symptoms in this group, although she has not gotten over her paranoia  and overly Mu-ism pre-occupation.  Her affect was not as anxious or paranoid as on admission but  both were strongly evident. Her mood matched her affect. Her insight and judgment remain limited.

## 2018-01-23 NOTE — BH NOTES
GROUP THERAPY PROGRESS NOTE    Gus Schultz is participating in Reflections. Group time: 20 minutes    Personal goal for participation: daily progress    Goal orientation: personal    Group therapy participation: active    Therapeutic interventions reviewed and discussed: \"Red Flags\" and \"Relaxation\" word search handouts. Impression of participation: Seems in good spirits. Voiced no complaints. Observed doing crunches on floor of room earlier in shift. Some Buddhism preoccupation as makes references to praying and reading Bible presumably as way of coping. Also made reference to walking long distances.

## 2018-01-23 NOTE — BH NOTES
PSYCHIATRIC PROGRESS NOTE         Patient Name  Regine Nguyen   Date of Birth 1965   [facility] [id number]   Medical Record Number  [id number]      Age  48 y.o. PCP None   Admit date:  1/17/2018    Room Number  734/01  @ [facility]   Date of Service  1/23/2018          PSYCHOTHERAPY SESSION NOTE:  Length of psychotherapy session: 15 minutes    Main condition/diagnosis/issues treated during session today, 1/23/2018 : bipolar disorder, poverty. Seeking resource. I employed Cognitive Behavioral therapy techniques, Reality-Oriented psychotherapy, as well as supportive psychotherapy in regards to various ongoing psychosocial stressors, including the following: pre-admission and current problems; housing issues; occupational issues;medical issues; and stress of hospitalization. Interpersonal relationship issues and psychodynamic conflicts explored. Attempts made to alleviate maladaptive patterns. We, also, worked on issues of denial & effects of substance dependency/use     Overall, patient is not progressing    Treatment Plan Update (reviewed an updated 1/23/2018) : I will modify psychotherapy tx plan by implementing more stress management strategies, building upon cognitive behavioral techniques, increasing coping skills, as well as shoring up psychological defenses). An extended energy and skill set was needed to engage pt in psychotherapy due to some of the following: resistiveness, complexity, negativity, confrontational nature, hostile behaviors, and/or severe abnormalities in thought processes/psychosis resulting in the loss of expressive/receptive language communication skills. E & M PROGRESS NOTE:         HISTORY       CC:  \"poor self care \"  HISTORY OF PRESENT ILLNESS/INTERVAL HISTORY:  (reviewed/updated 1/23/2018).   per initial evaluation:     Regine Nguyen presents/reports/evidences the following emotional symptoms today, 1/23/2018:psychotic behavior and manic behavior. The above symptoms have been present for several days . These symptoms are of severe severity. The symptoms are constant  in nature. Additional symptomatology and features include anxiety, physical complaints. Continued reid. She says that she plans to stay on medications after discharge    1/22/18- Repetative, chatty with mildly pressured speech. Poor insight . thnking still disordered  1/23/18- Still mildly disorganized with poor insight. Has poor self care skills, will increase Haldol                SIDE EFFECTS: (reviewed/updated 1/23/2018)  None reported or admitted to. No noted toxicity with use of lithium   ALLERGIES:(reviewed/updated 1/23/2018)  No Known Allergies   MEDICATIONS PRIOR TO ADMISSION:(reviewed/updated 1/23/2018)  Prescriptions Prior to Admission   Medication Sig    lisinopril-hydroCHLOROthiazide (PRINZIDE, ZESTORETIC) 20-25 mg per tablet Take 1 Tab by mouth daily.  fish oil-omega-3 fatty acids 340-1,000 mg capsule Take 1 Cap by mouth daily.  therapeutic multivitamin (THERAGRAN) tablet Take 1 Tab by mouth daily. PAST MEDICAL HISTORY: Past medical history from the initial psychiatric evaluation has been reviewed (reviewed/updated 1/23/2018) with no additional updates (I asked patient and no additional past medical history provided). Past Medical History:   Diagnosis Date    Arthritis     Bilateral knees    Hallucination     Paranoia ([facility] [place] [address]     Psychiatric disorder     anxiety,bipolar   No past surgical history on file. SOCIAL HISTORY: Social history from the initial psychiatric evaluation has been reviewed (reviewed/updated 1/23/2018) with no additional updates (I asked patient and no additional social history provided). Social History     Social History    Marital status: SINGLE     Spouse name: N/A    Number of children: N/A    Years of education: N/A     Occupational History    Not on file.      Social History Main Topics    Smoking status: Heavy Tobacco Smoker    Smokeless tobacco: Former User    Alcohol use No    Drug use: No    Sexual activity: Not on file     Other Topics Concern    Not on file     Social History Narrative    50year old AA female admitted on TDO for walking out of her paid apt. And stating she wants to be homeless because \"people have been following me and tazing me. \" Pt has severe financial problems and this has been a huge stressor. She is not on disability but is inn subsidized housing. [facility] but has not always complied due to transportation problem. FAMILY HISTORY: Family history from the initial psychiatric evaluation has been reviewed (reviewed/updated 1/23/2018) with no additional updates (I asked patient and no additional family history provided). No family history on file. REVIEW OF SYSTEMS: (reviewed/updated 1/23/2018)  Appetite:no change from normal   Sleep: decreased more than normal   All other Review of Systems: Psychological ROS: positive for - anxiety  Respiratory ROS: no cough, shortness of breath, or wheezing  Cardiovascular ROS: no chest pain or dyspnea on exertion         [facility] ([facility]):    [facility] FINDINGS ARE WITHIN NORMAL LIMITS (WNL) UNLESS OTHERWISE STATED BELOW. ( ALL OF THE BELOW CATEGORIES OF THE MSE HAVE BEEN REVIEWED (reviewed 1/23/2018) AND UPDATED AS DEEMED APPROPRIATE )  General Presentation age appropriate, cooperative   Orientation oriented to time, place and person   Vital Signs  See below (reviewed 1/23/2018); Vital Signs (BP, Pulse, & Temp) are within normal limits if not listed below.    Gait and Station Stable/steady, no ataxia   Musculoskeletal System No extrapyramidal symptoms (EPS); no abnormal muscular movements or Tardive Dyskinesia (TD); muscle strength and tone are within normal limits   Language No aphasia or dysarthria   Speech:  normal pitch   Thought Processes concrete; normal rate of thoughts; fair abstract reasoning/computation   Thought Associations goal directed   Thought Content paranoid delusions   Suicidal Ideations none   Homicidal Ideations none   Mood:  stable    Affect:  mood-congruent   Memory recent  fair   Memory remote:  fair   Concentration/Attention:  distractable   Fund of Knowledge average   Insight:  good   Reliability poor   Judgment:  limited          VITALS:     Patient Vitals for the past 24 hrs:   Temp Pulse Resp BP SpO2   01/23/18 0750 98 °F (36.7 °C) 97 16 135/84 98 %   01/22/18 2018 98.1 °F (36.7 °C) 99 18 130/85 -   01/22/18 1630 98.6 °F (37 °C) (!) 103 16 127/51 97 %     Wt Readings from Last 3 Encounters:   01/18/18 89.8 kg (198 lb)   01/15/18 90.7 kg (200 lb)     Temp Readings from Last 3 Encounters:   01/23/18 98 °F (36.7 °C)   01/15/18 98.6 °F (37 °C)   10/11/17 98.5 °F (36.9 °C)     BP Readings from Last 3 Encounters:   01/23/18 135/84   01/15/18 132/67   10/11/17 142/78     Pulse Readings from Last 3 Encounters:   01/23/18 97   01/15/18 80   10/11/17 70            DATA     LABORATORY DATA:(reviewed/updated 1/23/2018)  Recent Results (from the past 24 hour(s))   LIPID PANEL    Collection Time: 01/23/18  5:58 AM   Result Value Ref Range    LIPID PROFILE          Cholesterol, total 221 (H) <200 MG/DL    Triglyceride 134 <150 MG/DL    HDL Cholesterol 43 MG/DL    LDL, calculated 151.2 (H) 0 - 100 MG/DL    VLDL, calculated 26.8 MG/DL    CHOL/HDL Ratio 5.1 (H) 0 - 5.0     METABOLIC PANEL, COMPREHENSIVE    Collection Time: 01/23/18  5:58 AM   Result Value Ref Range    Sodium 134 (L) 136 - 145 mmol/L    Potassium 4.0 3.5 - 5.1 mmol/L    Chloride 101 97 - 108 mmol/L    CO2 26 21 - 32 mmol/L    Anion gap 7 5 - 15 mmol/L    Glucose 102 (H) 65 - 100 mg/dL    BUN 17 6 - 20 MG/DL    Creatinine 0.99 0.55 - 1.02 MG/DL    BUN/Creatinine ratio 17 12 - 20      GFR est AA >60 >60 ml/min/1.73m2    GFR est non-AA 59 (L) >60 ml/min/1.73m2    Calcium 9.8 8.5 - 10.1 MG/DL    Bilirubin, total 0.4 0.2 - 1.0 MG/DL    ALT (SGPT) 29 12 - 78 U/L    AST (SGOT) 14 (L) 15 - 37 U/L    Alk. phosphatase 80 45 - 117 U/L    Protein, total 8.2 6.4 - 8.2 g/dL    Albumin 3.5 3.5 - 5.0 g/dL    Globulin 4.7 (H) 2.0 - 4.0 g/dL    A-G Ratio 0.7 (L) 1.1 - 2.2     LITHIUM    Collection Time: 01/23/18  5:58 AM   Result Value Ref Range    Lithium level 0.83 0.60 - 1.20 MMOL/L    Reported dose date: NOT PROVIDED      Reported dose time: NOT PROVIDED      Reported dose: NOT PROVIDED UNITS     No results found for: VALF2, VALAC, VALP, VALPR, DS6, CRBAM, CRBAMP, CARB2, XCRBAM  Lab Results   Component Value Date/Time    Lithium level 0.83 01/23/2018 05:58 AM      RADIOLOGY REPORTS:(reviewed/updated 1/23/2018)  No results found.        MEDICATIONS     ALL MEDICATIONS:   Current Facility-Administered Medications   Medication Dose Route Frequency    fish oil-omega-3 fatty acids 340-1,000 mg capsule 1 Cap  1 Cap Oral DAILY    haloperidol (HALDOL) 2 mg/mL oral solution 10 mg  10 mg Oral QHS    lithium citrate 8 mEq/5 mL (5 mL) oral solution 900 mg  900 mg Oral QHS    tiZANidine (ZANAFLEX) tablet 2 mg  2 mg Oral Q6H PRN    zolpidem (AMBIEN) tablet 10 mg  10 mg Oral QHS PRN    lisinopril (PRINIVIL, ZESTRIL) tablet 20 mg  20 mg Oral DAILY    hydroCHLOROthiazide (HYDRODIURIL) tablet 25 mg  25 mg Oral DAILY    ziprasidone (GEODON) 20 mg in sterile water (preservative free) 1 mL injection  20 mg IntraMUSCular BID PRN    OLANZapine (ZyPREXA) tablet 5 mg  5 mg Oral Q6H PRN    benztropine (COGENTIN) tablet 2 mg  2 mg Oral BID PRN    benztropine (COGENTIN) injection 2 mg  2 mg IntraMUSCular BID PRN    LORazepam (ATIVAN) injection 2 mg  2 mg IntraMUSCular Q4H PRN    LORazepam (ATIVAN) tablet 1 mg  1 mg Oral Q4H PRN    acetaminophen (TYLENOL) tablet 650 mg  650 mg Oral Q4H PRN    ibuprofen (MOTRIN) tablet 400 mg  400 mg Oral Q8H PRN    magnesium hydroxide (MILK OF MAGNESIA) 400 mg/5 mL oral suspension 30 mL  30 mL Oral DAILY PRN    nicotine (NICODERM CQ) 21 mg/24 hr patch 1 Patch  1 Patch TransDERmal DAILY PRN      SCHEDULED MEDICATIONS:   Current Facility-Administered Medications   Medication Dose Route Frequency    fish oil-omega-3 fatty acids 340-1,000 mg capsule 1 Cap  1 Cap Oral DAILY    haloperidol (HALDOL) 2 mg/mL oral solution 10 mg  10 mg Oral QHS    lithium citrate 8 mEq/5 mL (5 mL) oral solution 900 mg  900 mg Oral QHS    lisinopril (PRINIVIL, ZESTRIL) tablet 20 mg  20 mg Oral DAILY    hydroCHLOROthiazide (HYDRODIURIL) tablet 25 mg  25 mg Oral DAILY          ASSESSMENT & PLAN     DIAGNOSES REQUIRING ACTIVE TREATMENT AND MONITORING: (reviewed/updated 1/23/2018)  Patient Active Hospital Problem List:   Bipolar 1 disorder ([facility] (1/17/2018)    Assessment: reid alternating with depression     Plan: lithum and haldol  Monitor lithium level      Back pain  Assessment: muscle sprain  Plan: Tizanidine            I will continue to monitor blood levels (Depakote, Tegretol, lithium, clozapine---a drug with a narrow therapeutic index= NTI) and associated labs for drug therapy implemented that require intense monitoring for toxicity as deemed appropriate based on current medication side effects and pharmacodynamically determined drug 1/2 lives. In summary, Meagan Jack, is a 48 y.o.  female who presents with a severe exacerbation of the principal diagnosis [facility]  Patient's condition is worsening/not improving/not stable . Patient requires continued inpatient hospitalization for further stabilization, safety monitoring and medication management. I will continue to coordinate the provision of individual, milieu, occupational, group, and substance abuse therapies to address target symptoms/diagnoses as deemed appropriate for the individual patient. A coordinated, multidisplinary treatment team round was conducted with the patient (this team consists of the nurse, psychiatric unit pharmcist,  and writer). Complete current electronic health record for patient has been reviewed today including consultant notes, ancillary staff notes, nurses and psychiatric tech notes. Suicide risk assessment completed and patient deemed to be of low risk for suicide at this time. The following regarding medications was addressed during rounds with patient:   the risks and benefits of the proposed medication. The patient was given the opportunity to ask questions. Informed consent given to the use of the above medications. Will continue to adjust psychiatric and non-psychiatric medications (see above \"medication\" section and orders section for details) as deemed appropriate & based upon diagnoses and response to treatment. I will continue to order blood tests/labs and diagnostic tests as deemed appropriate and review results as they become available (see orders for details and above listed lab/test results). I will order psychiatric records from previous Psychiatric hospitals to further elucidate the nature of patient's psychopathology and review once available. I will gather additional collateral information from friends, family and o/p treatment team to further elucidate the nature of patient's psychopathology and baselline level of psychiatric functioning. I certify that this patient's inpatient psychiatric hospital services furnished since the previous certification were, and continue to be, required for treatment that could reasonably be expected to improve the patient's condition, or for diagnostic study, and that the patient continues to need, on a daily basis, active treatment furnished directly by or requiring the supervision of inpatient psychiatric facility personnel. In addition, the hospital records show that services furnished were intensive treatment services, admission or related services, or equivalent services.     EXPECTED DISCHARGE DATE/DAY: TBD     DISPOSITION: Home       Signed By:   Bi Cummins Orville Sierra MD  1/23/2018

## 2018-01-24 LAB
ESTRADIOL SERPL-MCNC: <5 PG/ML
ESTRONE SERPL-MCNC: 56 PG/ML
FSH SERPL-ACNC: 19.7 MIU/ML
LH SERPL-ACNC: 4.7 MIU/ML
PROGEST SERPL-MCNC: <0.1 NG/ML

## 2018-01-24 PROCEDURE — 74011250637 HC RX REV CODE- 250/637: Performed by: PSYCHIATRY & NEUROLOGY

## 2018-01-24 PROCEDURE — 65220000003 HC RM SEMIPRIVATE PSYCH

## 2018-01-24 RX ORDER — LITHIUM CARBONATE 450 MG/1
900 TABLET ORAL
Status: DISCONTINUED | OUTPATIENT
Start: 2018-01-24 | End: 2018-01-25

## 2018-01-24 RX ORDER — HALOPERIDOL 10 MG/1
10 TABLET ORAL
Status: DISCONTINUED | OUTPATIENT
Start: 2018-01-24 | End: 2018-01-25

## 2018-01-24 RX ADMIN — HALOPERIDOL 10 MG: 10 TABLET ORAL at 21:15

## 2018-01-24 RX ADMIN — Medication 1 CAPSULE: at 08:59

## 2018-01-24 RX ADMIN — LISINOPRIL 20 MG: 20 TABLET ORAL at 08:59

## 2018-01-24 RX ADMIN — LITHIUM CARBONATE 900 MG: 450 TABLET, EXTENDED RELEASE ORAL at 21:15

## 2018-01-24 RX ADMIN — IBUPROFEN 400 MG: 400 TABLET, FILM COATED ORAL at 11:32

## 2018-01-24 RX ADMIN — HYDROCHLOROTHIAZIDE 25 MG: 25 TABLET ORAL at 08:59

## 2018-01-24 NOTE — PROGRESS NOTES
01/24/18 1132   Pain 1   Pain Scale 1 Numeric (0 - 10)   Pain Intensity 1 3   Patient Stated Pain Goal 0   Pain Onset 1 now   Pain Location 1 Back   Pain Orientation 1 Left   Pain Description 1 Aching   Pain Intervention(s) 1 Medication (see MAR)   POSS Scale   Opioid Sedation Scale 1        Motrin po given    1230- pain improved per pt

## 2018-01-24 NOTE — BH NOTES
PSYCHIATRIC PROGRESS NOTE         Patient Name  Betina Burciaga   Date of Birth 1965   CSN [id number]   Medical Record Number  [id number]      Age  48 y.o. PCP None   Admit date:  1/17/2018    Room Number  734/01  @ [facility]   Date of Service  1/24/2018          PSYCHOTHERAPY SESSION NOTE:  Length of psychotherapy session: 15 minutes    Main condition/diagnosis/issues treated during session today, 1/24/2018 : bipolar disorder, poverty. Seeking resource. I employed Cognitive Behavioral therapy techniques, Reality-Oriented psychotherapy, as well as supportive psychotherapy in regards to various ongoing psychosocial stressors, including the following: pre-admission and current problems; housing issues; occupational issues;medical issues; and stress of hospitalization. Interpersonal relationship issues and psychodynamic conflicts explored. Attempts made to alleviate maladaptive patterns. We, also, worked on issues of denial & effects of substance dependency/use     Overall, patient is not progressing    Treatment Plan Update (reviewed an updated 1/24/2018) : I will modify psychotherapy tx plan by implementing more stress management strategies, building upon cognitive behavioral techniques, increasing coping skills, as well as shoring up psychological defenses). An extended energy and skill set was needed to engage pt in psychotherapy due to some of the following: resistiveness, complexity, negativity, confrontational nature, hostile behaviors, and/or severe abnormalities in thought processes/psychosis resulting in the loss of expressive/receptive language communication skills. E & M PROGRESS NOTE:         HISTORY       CC:  \"poor self care \"  HISTORY OF PRESENT ILLNESS/INTERVAL HISTORY:  (reviewed/updated 1/24/2018).   per initial evaluation:     Betina Burciaga presents/reports/evidences the following emotional symptoms today, 1/24/2018:psychotic behavior and manic behavior. The above symptoms have been present for several days . These symptoms are of severe severity. The symptoms are constant  in nature. Additional symptomatology and features include anxiety, physical complaints. Continued reid. She says that she plans to stay on medications after discharge    1/22/18- Repetative, chatty with mildly pressured speech. Poor insight . thnking still disordered  1/23/18- Still mildly disorganized with poor insight. Has poor self care skills, will increase Haldol  1/24/18- Still showing poor understanding of what she needs to do to live independently. No longer kushal. Pleasant                SIDE EFFECTS: (reviewed/updated 1/24/2018)  None reported or admitted to. No noted toxicity with use of lithium   ALLERGIES:(reviewed/updated 1/24/2018)  No Known Allergies   MEDICATIONS PRIOR TO ADMISSION:(reviewed/updated 1/24/2018)  Prescriptions Prior to Admission   Medication Sig    lisinopril-hydroCHLOROthiazide (PRINZIDE, ZESTORETIC) 20-25 mg per tablet Take 1 Tab by mouth daily.  fish oil-omega-3 fatty acids 340-1,000 mg capsule Take 1 Cap by mouth daily.  therapeutic multivitamin (THERAGRAN) tablet Take 1 Tab by mouth daily. PAST MEDICAL HISTORY: Past medical history from the initial psychiatric evaluation has been reviewed (reviewed/updated 1/24/2018) with no additional updates (I asked patient and no additional past medical history provided). Past Medical History:   Diagnosis Date    Arthritis     Bilateral knees    Hallucination     Paranoia ([facility] [facility]     Psychiatric disorder     anxiety,bipolar   No past surgical history on file. SOCIAL HISTORY: Social history from the initial psychiatric evaluation has been reviewed (reviewed/updated 1/24/2018) with no additional updates (I asked patient and no additional social history provided).    Social History     Social History    Marital status: SINGLE     Spouse name: N/A    Number of children: N/A    Years of education: N/A     Occupational History    Not on file. Social History Main Topics    Smoking status: Heavy Tobacco Smoker    Smokeless tobacco: Former User    Alcohol use No    Drug use: No    Sexual activity: Not on file     Other Topics Concern    Not on file     Social History Narrative    50year old AA female admitted on TDO for walking out of her paid apt. And stating she wants to be homeless because \"people have been following me and tazing me. \" Pt has severe financial problems and this has been a huge stressor. She is not on disability but is inn subsidized housing. [facility] but has not always complied due to transportation problem. FAMILY HISTORY: Family history from the initial psychiatric evaluation has been reviewed (reviewed/updated 1/24/2018) with no additional updates (I asked patient and no additional family history provided). No family history on file. REVIEW OF SYSTEMS: (reviewed/updated 1/24/2018)  Appetite:no change from normal   Sleep: decreased more than normal   All other Review of Systems: Psychological ROS: positive for - anxiety  Respiratory ROS: no cough, shortness of breath, or wheezing  Cardiovascular ROS: no chest pain or dyspnea on exertion         [address] (MSE):    MSE FINDINGS ARE WITHIN NORMAL LIMITS (WNL) UNLESS OTHERWISE STATED BELOW. ( ALL OF THE BELOW CATEGORIES OF THE MSE HAVE BEEN REVIEWED (reviewed 1/24/2018) AND UPDATED AS DEEMED APPROPRIATE )  General Presentation age appropriate, cooperative   Orientation oriented to time, place and person   Vital Signs  See below (reviewed 1/24/2018); Vital Signs (BP, Pulse, & Temp) are within normal limits if not listed below.    Gait and Station Stable/steady, no ataxia   Musculoskeletal System No extrapyramidal symptoms (EPS); no abnormal muscular movements or Tardive Dyskinesia (TD); muscle strength and tone are within normal limits   Language No aphasia or dysarthria   Speech:  normal pitch   Thought Processes concrete; normal rate of thoughts; fair abstract reasoning/computation   Thought Associations goal directed   Thought Content paranoid delusions   Suicidal Ideations none   Homicidal Ideations none   Mood:  stable    Affect:  mood-congruent   Memory recent  fair   Memory remote:  fair   Concentration/Attention:  distractable   Fund of Knowledge average   Insight:  good   Reliability poor   Judgment:  limited          VITALS:     Patient Vitals for the past 24 hrs:   Temp Pulse Resp BP SpO2   01/24/18 0745 97.8 °F (36.6 °C) 89 16 129/76 100 %   01/23/18 1924 98.3 °F (36.8 °C) 91 16 147/90 -   01/23/18 1558 98.5 °F (36.9 °C) 80 16 135/81 99 %     Wt Readings from Last 3 Encounters:   01/18/18 89.8 kg (198 lb)   01/15/18 90.7 kg (200 lb)     Temp Readings from Last 3 Encounters:   01/24/18 97.8 °F (36.6 °C)   01/15/18 98.6 °F (37 °C)   10/11/17 98.5 °F (36.9 °C)     BP Readings from Last 3 Encounters:   01/24/18 129/76   01/15/18 132/67   10/11/17 142/78     Pulse Readings from Last 3 Encounters:   01/24/18 89   01/15/18 80   10/11/17 70            DATA     LABORATORY DATA:(reviewed/updated 1/24/2018)  No results found for this or any previous visit (from the past 24 hour(s)). No results found for: VALF2, VALAC, VALP, VALPR, DS6, CRBAM, CRBAMP, CARB2, XCRBAM  Lab Results   Component Value Date/Time    Lithium level 0.83 01/23/2018 05:58 AM      RADIOLOGY REPORTS:(reviewed/updated 1/24/2018)  No results found.        MEDICATIONS     ALL MEDICATIONS:   Current Facility-Administered Medications   Medication Dose Route Frequency    haloperidol (HALDOL) tablet 10 mg  10 mg Oral QHS    lithium carbonate CR (ESKALITH CR) tablet 900 mg  900 mg Oral QHS    fish oil-omega-3 fatty acids 340-1,000 mg capsule 1 Cap  1 Cap Oral DAILY    tiZANidine (ZANAFLEX) tablet 2 mg  2 mg Oral Q6H PRN    zolpidem (AMBIEN) tablet 10 mg  10 mg Oral QHS PRN    lisinopril (PRINIVIL, ZESTRIL) tablet 20 mg  20 mg Oral DAILY    hydroCHLOROthiazide (HYDRODIURIL) tablet 25 mg  25 mg Oral DAILY    ziprasidone (GEODON) 20 mg in sterile water (preservative free) 1 mL injection  20 mg IntraMUSCular BID PRN    OLANZapine (ZyPREXA) tablet 5 mg  5 mg Oral Q6H PRN    benztropine (COGENTIN) tablet 2 mg  2 mg Oral BID PRN    benztropine (COGENTIN) injection 2 mg  2 mg IntraMUSCular BID PRN    LORazepam (ATIVAN) injection 2 mg  2 mg IntraMUSCular Q4H PRN    LORazepam (ATIVAN) tablet 1 mg  1 mg Oral Q4H PRN    acetaminophen (TYLENOL) tablet 650 mg  650 mg Oral Q4H PRN    ibuprofen (MOTRIN) tablet 400 mg  400 mg Oral Q8H PRN    magnesium hydroxide (MILK OF MAGNESIA) 400 mg/5 mL oral suspension 30 mL  30 mL Oral DAILY PRN    nicotine (NICODERM CQ) 21 mg/24 hr patch 1 Patch  1 Patch TransDERmal DAILY PRN      SCHEDULED MEDICATIONS:   Current Facility-Administered Medications   Medication Dose Route Frequency    haloperidol (HALDOL) tablet 10 mg  10 mg Oral QHS    lithium carbonate CR (ESKALITH CR) tablet 900 mg  900 mg Oral QHS    fish oil-omega-3 fatty acids 340-1,000 mg capsule 1 Cap  1 Cap Oral DAILY    lisinopril (PRINIVIL, ZESTRIL) tablet 20 mg  20 mg Oral DAILY    hydroCHLOROthiazide (HYDRODIURIL) tablet 25 mg  25 mg Oral DAILY          ASSESSMENT & PLAN     DIAGNOSES REQUIRING ACTIVE TREATMENT AND MONITORING: (reviewed/updated 1/24/2018)  Patient Active Hospital Problem List:   [facility] (1/17/2018)    Assessment: reid alternating with depression     Plan: lithum and haldol  Monitor lithium level      Back pain  Assessment: muscle sprain  Plan: Tizanidine            I will continue to monitor blood levels (Depakote, Tegretol, lithium, clozapine---a drug with a narrow therapeutic index= NTI) and associated labs for drug therapy implemented that require intense monitoring for toxicity as deemed appropriate based on current medication side effects and pharmacodynamically determined drug 1/2 lives. In summary, Dain Lawrence, is a 48 y.o.  female who presents with a severe exacerbation of the principal diagnosis of Bipolar 1 disorder ([facility] [address]  Patient's condition is worsening/not improving/not stable . Patient requires continued inpatient hospitalization for further stabilization, safety monitoring and medication management. I will continue to coordinate the provision of individual, milieu, occupational, group, and substance abuse therapies to address target symptoms/diagnoses as deemed appropriate for the individual patient. A coordinated, multidisplinary treatment team round was conducted with the patient (this team consists of the nurse, psychiatric unit pharmcist,  and writer). Complete current electronic health record for patient has been reviewed today including consultant notes, ancillary staff notes, nurses and psychiatric tech notes. Suicide risk assessment completed and patient deemed to be of low risk for suicide at this time. The following regarding medications was addressed during rounds with patient:   the risks and benefits of the proposed medication. The patient was given the opportunity to ask questions. Informed consent given to the use of the above medications. Will continue to adjust psychiatric and non-psychiatric medications (see above \"medication\" section and orders section for details) as deemed appropriate & based upon diagnoses and response to treatment. I will continue to order blood tests/labs and diagnostic tests as deemed appropriate and review results as they become available (see orders for details and above listed lab/test results). I will order psychiatric records from previous [facility] hospitals to further elucidate the nature of patient's psychopathology and review once available.     I will gather additional collateral information from friends, family and o/p treatment team to further elucidate the nature of patient's psychopathology and baselline level of psychiatric functioning. I certify that this patient's inpatient psychiatric hospital services furnished since the previous certification were, and continue to be, required for treatment that could reasonably be expected to improve the patient's condition, or for diagnostic study, and that the patient continues to need, on a daily basis, active treatment furnished directly by or requiring the supervision of inpatient psychiatric facility personnel. In addition, the hospital records show that services furnished were intensive treatment services, admission or related services, or equivalent services.     EXPECTED DISCHARGE DATE/DAY: TBD     DISPOSITION: Home       Signed By:   Jacky Miranda MD  1/24/2018

## 2018-01-24 NOTE — PROGRESS NOTES
[facility]  Master Treatment Plan [place]         Date Treatment Plan Initiated: 1/24      Treatment Plan Modalities:    Type of Modality Amount  (x minutes) Frequency (x/week) Duration (x days) Name of Responsible Staff   Community & wrap-up meetings to encourage peer interactions    15    7    1     RADHA Moy   Group psychotherapy to assist in building coping skills and internal controls    60    7    1    Willie Tamez LCSW   Therapeutic activity groups to build coping skills    60    7    1    Willie Tamez LCSW   Psychoeducation in group setting to address:   Medication education    15    7    1    T Yvonne Christianson RN   Coping skills    20    7    1    James Sotelo RN   Relaxation techniques           Symptom management           Discharge planning    15    7    1    T Suzann Brittle,    Spirituality     60    7    1     Deondre ANDREWS    60    7    1    Volunteer from [place]   [facility]/AA/NA    60    7    1    Volunteer from [address] [place] [address] medication management    15    7    1    Dr. Landa Pap meeting/discharge planning                           Goals to be met by 1/30/18    Problem: Falls - Risk of  Goal: *Absence of Falls  Document Kendra Fall Risk and appropriate interventions in the flowsheet. Outcome: Progressing Towards Goal  Fall Risk Interventions:            Medication Interventions: Teach patient to arise slowly                  Problem: Anxiety-Behavioral Health (Adult/Pediatric)  Goal: *STG: Remains safe in hospital  Outcome: Progressing Towards Goal  Pt denies any suicidal or homicidal thoughts. Contracts for safety. Remains on q 15 min safety checks.

## 2018-01-24 NOTE — PROGRESS NOTES
Problem: Falls - Risk of  Goal: *Absence of Falls  Document Kendra Fall Risk and appropriate interventions in the flowsheet. Outcome: Progressing Towards Goal  Fall Risk Interventions:   Pt in bed asleep. NAD. Respirations even and unlabored. No falls noted/reported. Q 15 minute checks for safety maintained.          Medication Interventions: Teach patient to arise slowly

## 2018-01-24 NOTE — INTERDISCIPLINARY ROUNDS
Behavioral Health Interdisciplinary Rounds     Patient Name: Rakel Corona  Age: 48 y.o. Room/Bed:  734/  Primary Diagnosis: Bipolar 1 disorder (HCC)   Admission Status: Voluntary     Readmission within 30 days: no  Power of  in place: no   Patient requires a blocked bed: no          Reason for blocked bed: n/a    VTE Prophylaxis: No  Flu vaccine given : yes - PTA   Mobility needs/Fall risk: no    Nutritional Plan: no  Consults:          Labs/Testing due today?: no    Sleep hours: 7.0       Participation in Care/Groups:  yes  Medication Compliant?: Yes  PRNS (last 24 hours): Pain    Restraints (last 24 hours):  no  Substance Abuse:  no  CIWA (range last 24 hours):  COWS (range last 24 hours):   Alcohol screening (AUDIT) completed -  AUDIT Score: 0  If applicable, date SBIRT discussed in treatment team AND documented:   Tobacco - patient is a smoker: no   Date tobacco education completed by RN: n/a  24 hour chart check complete: yes     Patient goal(s) for today:   Treatment team focus/goals: plan to add Depakote and change medications to pill form. Trying to reach son to discuss discharge plan and follow up. Progress note - She has been doing better on the unit.      LOS:  7  Expected LOS: TBD    Financial concerns/prescription coverage:    Date of last family contact:  SW called and left a message for patients son , but have not received a return call     Family requesting physician contact today:    Discharge plan: she will return to her apartment  Guns in the home:  no       Outpatient provider(s): Refer to [address] [place] [address] Pkwy     Participating treatment team members: Marques Bhandari Dr., RN

## 2018-01-24 NOTE — BH NOTES
GROUP THERAPY PROGRESS NOTE    The patient Render Carly is participating in [employer]. Group time: 30 minutes    Personal goal for participation: to orient the patient to the unit.     Goal orientation: successful adoption of unit rules    Group therapy participation: active    Therapeutic interventions reviewed and discussed: Yes    Impression of participation:     Morris Rojas  1/24/2018 10:08 AM
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
## 2018-01-24 NOTE — PROGRESS NOTES
Laboratory Monitoring for Lithium: This patient is currently prescribed the following medication(s):   Current Facility-Administered Medications   Medication Dose Route Frequency    haloperidol (HALDOL) tablet 10 mg  10 mg Oral QHS    lithium carbonate CR (ESKALITH CR) tablet 900 mg  900 mg Oral QHS    fish oil-omega-3 fatty acids 340-1,000 mg capsule 1 Cap  1 Cap Oral DAILY    lisinopril (PRINIVIL, ZESTRIL) tablet 20 mg  20 mg Oral DAILY    hydroCHLOROthiazide (HYDRODIURIL) tablet 25 mg  25 mg Oral DAILY     The following labs have been completed for monitoring of lithium:    Lithium Serum Concentration  Lab Results   Component Value Date/Time    Lithium level 0.83 01/23/2018 05:58 AM     Renal Function and Chemistry  Lab Results   Component Value Date/Time    Sodium 134 01/23/2018 05:58 AM    Potassium 4.0 01/23/2018 05:58 AM    Chloride 101 01/23/2018 05:58 AM    CO2 26 01/23/2018 05:58 AM    Anion gap 7 01/23/2018 05:58 AM    BUN 17 01/23/2018 05:58 AM    Creatinine 0.99 01/23/2018 05:58 AM    BUN/Creatinine ratio 17 01/23/2018 05:58 AM     Assessment/Plan:  A lithium level was drawn on 1/23/18 @ 0558 and found to be 0.83 mmol/L. This level is reflective of steady state (after @ least 4 days of therapy). Therapeutic lithium level is generally 0.8-1.2mmol/L (however, some patients may have a target level lower than 0.8mmol/L). This level is considered therapeutic (however, true trough is actually slightly lower). Would recommend continuing current dose of 900mg QHS.      Eleni Yarbrough, PharmD, BCPP, [facility] Specialist, [facility]

## 2018-01-25 PROCEDURE — 65220000003 HC RM SEMIPRIVATE PSYCH

## 2018-01-25 PROCEDURE — 74011250637 HC RX REV CODE- 250/637: Performed by: PSYCHIATRY & NEUROLOGY

## 2018-01-25 RX ORDER — LITHIUM CARBONATE 300 MG
300 TABLET ORAL 3 TIMES DAILY
Status: DISCONTINUED | OUTPATIENT
Start: 2018-01-25 | End: 2018-01-29

## 2018-01-25 RX ADMIN — LITHIUM CARBONATE 300 MG: 300 TABLET ORAL at 21:34

## 2018-01-25 RX ADMIN — IBUPROFEN 400 MG: 400 TABLET, FILM COATED ORAL at 09:53

## 2018-01-25 RX ADMIN — LISINOPRIL 20 MG: 20 TABLET ORAL at 08:10

## 2018-01-25 RX ADMIN — HALOPERIDOL 15 MG: 10 TABLET ORAL at 21:34

## 2018-01-25 RX ADMIN — ZOLPIDEM TARTRATE 10 MG: 10 TABLET ORAL at 22:05

## 2018-01-25 RX ADMIN — LITHIUM CARBONATE 300 MG: 300 TABLET ORAL at 17:00

## 2018-01-25 RX ADMIN — HYDROCHLOROTHIAZIDE 25 MG: 25 TABLET ORAL at 08:09

## 2018-01-25 RX ADMIN — Medication 1 CAPSULE: at 08:09

## 2018-01-25 NOTE — PROGRESS NOTES
Problem: Anxiety-Behavioral Health (Adult/Pediatric)  Goal: *STG/LTG: Complies with medication therapy  Outcome: Progressing Towards Goal  Pt took scheduled lithium with dinner

## 2018-01-25 NOTE — PROGRESS NOTES
Problem: Falls - Risk of  Goal: *Absence of Falls  Document Kendra Fall Risk and appropriate interventions in the flowsheet. Outcome: Progressing Towards Goal  Fall Risk Interventions:            Medication Interventions: Teach patient to arise slowly                  Problem: Anxiety-Behavioral Health (Adult/Pediatric)  Goal: *STG: Remains safe in hospital  Outcome: Progressing Towards Goal  Pt denies any suicidal or homicidal thoughts. Contracts for safety. Remains on q 15 min safety checks. 56- Pt was slightly defensive during treatment team this am. MMSE has improved to 28. Pt has poor insight on her illness. Voices concerns with money to afford meds. Remains on q 15 min safety checks.

## 2018-01-25 NOTE — BH NOTES
GROUP THERAPY PROGRESS NOTE    Roxanne Lovell participated in the Acute Unit's afternoon Process Group, with a focus on identifying feelings, planning for the rest of the day, and preparing for discharge. Group time: 45 minutes. Personal goal for participation: To increase the capacity to improve ones mood and structure. Goal orientation: The patient will be able to identify their feelings, develop a plan for structuring their day, and discharge planning. Group therapy participation: With prompting, this patient partially participated in the group. Therapeutic interventions reviewed and discussed: The group members were asked to introduce themselves to each other and to see if they could identify an emotion they are having and/or let the group know what they want to focus on for the day as they continue to make discharge plans. Impression of participation: The patient waited and listened to her peers before speaking. She said she \"was afraid she would over-talk,\" which she described as sharing more about her personal information and beliefs than appropriate. \"Some people don't want to hear the same thing, time after time. \" She expressed no SI/HI and displayed some cautiousness and suspiciousness regarding sharing in group. She added that she had been fired from one of her jobs because she couldn't stop talking (proselytizing?) at work. She still seems to be somewhat paranoid, religiously preoccupied, and defensive in a mannerly fashion. Her judgment and insight remain limited. Her affect was not as anxious or manic as she was when she first came into the hospital. Her mood matched her affect, with conscious guardedness.

## 2018-01-25 NOTE — PROGRESS NOTES
Problem: Psychosis  Goal: *STG: Remains safe in hospital  Outcome: Progressing Towards Goal  Pt visible on unit interacting with select peers. No acute distress noted at this time. Appetite is good and is compliant with scheduled medications. staff will continue to monitor q  15 min checks.

## 2018-01-25 NOTE — PROGRESS NOTES
Problem: Falls - Risk of  Goal: *Absence of Falls  Document Kendra Fall Risk and appropriate interventions in the flowsheet. Outcome: Progressing Towards Goal  Fall Risk Interventions:            Medication Interventions: Teach patient to arise slowly       Resting in bed with eyes closed, no complaints, no distress noted. Safety measures in place, will continue to monitor.

## 2018-01-25 NOTE — BH NOTES
GROUP THERAPY PROGRESS NOTE    Render Carly is participating in [place]. Group time: 15 minutes    Personal goal for participation: To prosper in all areas of her life, to be more independent.     Goal orientation: community    Group therapy participation: active    Therapeutic interventions reviewed and discussed: Review of unit guidelines and setting a daily goal.    Impression of participation: active

## 2018-01-25 NOTE — INTERDISCIPLINARY ROUNDS
Behavioral Health Interdisciplinary Rounds     Patient Name: Brigid Alanis  Age: 48 y.o. Room/Bed:  734/  Primary Diagnosis: Bipolar 1 disorder (HCC)   Admission Status: Voluntary     Readmission within 30 days: no  Power of  in place: no  Patient requires a blocked bed: no          Reason for blocked bed:     VTE Prophylaxis: No  Flu vaccine given : yes PTA  Mobility needs/Fall risk: no    Nutritional Plan: no  Consults:          Labs/Testing due today?: no    Sleep hours:  7+      Participation in Care/Groups:  yes  Medication Compliant?: Yes  PRNS (last 24 hours): None    Restraints (last 24 hours):  no  Substance Abuse:  no  CIWA (range last 24 hours):  COWS (range last 24 hours):  Alcohol screening (AUDIT) completed -  AUDIT Score: 0  If applicable, date SBIRT discussed in treatment team AND documented:   Tobacco - patient is a smoker: no   Date tobacco education completed by RN:   24 hour chart check complete: yes     Patient goal(s) for today:   Treatment team focus/goals:   Progress note: Pt reported feeling better and ready to be d/c     LOS:  8  Expected LOS:     Financial concerns/prescription coverage:    Date of last family contact:      Family requesting physician contact today: SW spoke to [facility] ( Dad\"s C- # [phone]) in regards to d/c planning for his mother. He is very concerned that she will not follow through with out pt tx. He is very supportive and plans to stay with her / week / adjustment and keeping appts. Discharge plan: Return to her home  Guns in the home:  N/A      Outpatient provider(s):  [facility]    Participating treatment team members: Brigid Alanis, KAYDEN Palomares PharmD, Wang Real RN and CARYN Yousif

## 2018-01-25 NOTE — BH NOTES
Mood upbeat  Smiling  No religiousity voiced today  Ate dinner with peers  Medication compliant  Offers no complaints   Denies pain discomfort or distress

## 2018-01-25 NOTE — BH NOTES
GROUP THERAPY PROGRESS NOTE    Meagan Jack is participating in Relaxation.      Group time: 20 minutes    Personal goal for participation:     Goal orientation: relaxation    Group therapy participation: active    Therapeutic interventions reviewed and discussed: Decreasing stimulation in environment in order to promote sleep      Impression of participation: Pt was engaged

## 2018-01-25 NOTE — BH NOTES
PSYCHIATRIC PROGRESS NOTE         Patient Name  Sue Velásquez   Date of Birth 1965   [facility] [id number]   Medical Record Number  [id number]      Age  48 y.o. PCP None   Admit date:  1/17/2018    Room Number  734/01  @ [facility]   Date of Service  1/25/2018          PSYCHOTHERAPY SESSION NOTE:  Length of psychotherapy session: 15 minutes    Main condition/diagnosis/issues treated during session today, 1/25/2018 : bipolar disorder, poverty. Seeking resource. I employed Cognitive Behavioral therapy techniques, Reality-Oriented psychotherapy, as well as supportive psychotherapy in regards to various ongoing psychosocial stressors, including the following: pre-admission and current problems; housing issues; occupational issues;medical issues; and stress of hospitalization. Interpersonal relationship issues and psychodynamic conflicts explored. Attempts made to alleviate maladaptive patterns. We, also, worked on issues of denial & effects of substance dependency/use     Overall, patient is not progressing    Treatment Plan Update (reviewed an updated 1/25/2018) : I will modify psychotherapy tx plan by implementing more stress management strategies, building upon cognitive behavioral techniques, increasing coping skills, as well as shoring up psychological defenses). An extended energy and skill set was needed to engage pt in psychotherapy due to some of the following: resistiveness, complexity, negativity, confrontational nature, hostile behaviors, and/or severe abnormalities in thought processes/psychosis resulting in the loss of expressive/receptive language communication skills. E & M PROGRESS NOTE:         HISTORY       CC:  \"poor self care \"  HISTORY OF PRESENT ILLNESS/INTERVAL HISTORY:  (reviewed/updated 1/25/2018).   per initial evaluation:     Sue Velásquez presents/reports/evidences the following emotional symptoms today, 1/25/2018:psychotic behavior and manic behavior. The above symptoms have been present for several days . These symptoms are of severe severity. The symptoms are constant  in nature. Additional symptomatology and features include anxiety, physical complaints. Continued reid. She says that she plans to stay on medications after discharge    1/22/18- Repetative, chatty with mildly pressured speech. Poor insight . thnking still disordered  1/23/18- Still mildly disorganized with poor insight. Has poor self care skills, will increase Haldol  1/24/18- Still showing poor understanding of what she needs to do to live independently. No longer kushal. Pleasant  1/25/17- Very paranoid about releasing contact information. Persevorates on her financial limitations but has no insight abou what a  could help her with. Still disorganized thoughts. Will increase haldol. SIDE EFFECTS: (reviewed/updated 1/25/2018)  None reported or admitted to. No noted toxicity with use of lithium   ALLERGIES:(reviewed/updated 1/25/2018)  No Known Allergies   MEDICATIONS PRIOR TO ADMISSION:(reviewed/updated 1/25/2018)  Prescriptions Prior to Admission   Medication Sig    lisinopril-hydroCHLOROthiazide (PRINZIDE, ZESTORETIC) 20-25 mg per tablet Take 1 Tab by mouth daily.  fish oil-omega-3 fatty acids 340-1,000 mg capsule Take 1 Cap by mouth daily.  therapeutic multivitamin (THERAGRAN) tablet Take 1 Tab by mouth daily. PAST MEDICAL HISTORY: Past medical history from the initial psychiatric evaluation has been reviewed (reviewed/updated 1/25/2018) with no additional updates (I asked patient and no additional past medical history provided). Past Medical History:   Diagnosis Date    Arthritis     Bilateral knees    Hallucination     Paranoia ([facility] [address]     Psychiatric disorder     anxiety,bipolar   No past surgical history on file.    SOCIAL HISTORY: Social history from the initial psychiatric evaluation has been reviewed (reviewed/updated 1/25/2018) with no additional updates (I asked patient and no additional social history provided). Social History     Social History    Marital status: SINGLE     Spouse name: N/A    Number of children: N/A    Years of education: N/A     Occupational History    Not on file. Social History Main Topics    Smoking status: Heavy Tobacco Smoker    Smokeless tobacco: Former User    Alcohol use No    Drug use: No    Sexual activity: Not on file     Other Topics Concern    Not on file     Social History Narrative    50year old AA female admitted on TDO for walking out of her paid apt. And stating she wants to be homeless because \"people have been following me and tazing me. \" Pt has severe financial problems and this has been a huge stressor. She is not on disability but is inn subsidized housing. She has [facility] but has not always complied due to transportation problem. FAMILY HISTORY: Family history from the initial psychiatric evaluation has been reviewed (reviewed/updated 1/25/2018) with no additional updates (I asked patient and no additional family history provided). No family history on file. REVIEW OF SYSTEMS: (reviewed/updated 1/25/2018)  Appetite:no change from normal   Sleep: decreased more than normal   All other Review of Systems: Psychological ROS: positive for - anxiety  Respiratory ROS: no cough, shortness of breath, or wheezing  Cardiovascular ROS: no chest pain or dyspnea on exertion         [address] [facility] ([facility]):    [facility] FINDINGS ARE WITHIN NORMAL LIMITS (WNL) UNLESS OTHERWISE STATED BELOW. ( ALL OF THE BELOW CATEGORIES OF THE MSE HAVE BEEN REVIEWED (reviewed 1/25/2018) AND UPDATED AS DEEMED APPROPRIATE )  General Presentation age appropriate, cooperative   Orientation oriented to time, place and person   Vital Signs  See below (reviewed 1/25/2018); Vital Signs (BP, Pulse, & Temp) are within normal limits if not listed below.    Gait and Station Stable/steady, no ataxia   Musculoskeletal System No extrapyramidal symptoms (EPS); no abnormal muscular movements or Tardive Dyskinesia (TD); muscle strength and tone are within normal limits   Language No aphasia or dysarthria   Speech:  normal pitch   Thought Processes concrete; normal rate of thoughts; fair abstract reasoning/computation   Thought Associations goal directed   Thought Content paranoid delusions   Suicidal Ideations none   Homicidal Ideations none   Mood:  stable    Affect:  mood-congruent   Memory recent  fair   Memory remote:  fair   Concentration/Attention:  distractable   Fund of Knowledge average   Insight:  good   Reliability poor   Judgment:  limited          VITALS:     Patient Vitals for the past 24 hrs:   Temp Pulse Resp BP SpO2   01/25/18 0745 98.3 °F (36.8 °C) 94 16 111/78 98 %   01/24/18 1939 97.8 °F (36.6 °C) 87 18 131/72 -   01/24/18 1554 98.1 °F (36.7 °C) 77 20 144/74 99 %     Wt Readings from Last 3 Encounters:   01/18/18 89.8 kg (198 lb)   01/15/18 90.7 kg (200 lb)     Temp Readings from Last 3 Encounters:   01/25/18 98.3 °F (36.8 °C)   01/15/18 98.6 °F (37 °C)   10/11/17 98.5 °F (36.9 °C)     BP Readings from Last 3 Encounters:   01/25/18 111/78   01/15/18 132/67   10/11/17 142/78     Pulse Readings from Last 3 Encounters:   01/25/18 94   01/15/18 80   10/11/17 70            DATA     LABORATORY DATA:(reviewed/updated 1/25/2018)  No results found for this or any previous visit (from the past 24 hour(s)). No results found for: VALF2, VALAC, VALP, VALPR, DS6, CRBAM, CRBAMP, CARB2, XCRBAM  Lab Results   Component Value Date/Time    Lithium level 0.83 01/23/2018 05:58 AM      RADIOLOGY REPORTS:(reviewed/updated 1/25/2018)  No results found.        MEDICATIONS     ALL MEDICATIONS:   Current Facility-Administered Medications   Medication Dose Route Frequency    haloperidol (HALDOL) tablet 15 mg  15 mg Oral QHS    lithium carbonate tablet 300 mg  300 mg Oral TID    fish oil-omega-3 fatty acids 340-1,000 mg capsule 1 Cap  1 Cap Oral DAILY    tiZANidine (ZANAFLEX) tablet 2 mg  2 mg Oral Q6H PRN    zolpidem (AMBIEN) tablet 10 mg  10 mg Oral QHS PRN    lisinopril (PRINIVIL, ZESTRIL) tablet 20 mg  20 mg Oral DAILY    hydroCHLOROthiazide (HYDRODIURIL) tablet 25 mg  25 mg Oral DAILY    ziprasidone (GEODON) 20 mg in sterile water (preservative free) 1 mL injection  20 mg IntraMUSCular BID PRN    OLANZapine (ZyPREXA) tablet 5 mg  5 mg Oral Q6H PRN    benztropine (COGENTIN) tablet 2 mg  2 mg Oral BID PRN    benztropine (COGENTIN) injection 2 mg  2 mg IntraMUSCular BID PRN    LORazepam (ATIVAN) injection 2 mg  2 mg IntraMUSCular Q4H PRN    LORazepam (ATIVAN) tablet 1 mg  1 mg Oral Q4H PRN    acetaminophen (TYLENOL) tablet 650 mg  650 mg Oral Q4H PRN    ibuprofen (MOTRIN) tablet 400 mg  400 mg Oral Q8H PRN    magnesium hydroxide (MILK OF MAGNESIA) 400 mg/5 mL oral suspension 30 mL  30 mL Oral DAILY PRN    nicotine (NICODERM CQ) 21 mg/24 hr patch 1 Patch  1 Patch TransDERmal DAILY PRN      SCHEDULED MEDICATIONS:   Current Facility-Administered Medications   Medication Dose Route Frequency    haloperidol (HALDOL) tablet 15 mg  15 mg Oral QHS    lithium carbonate tablet 300 mg  300 mg Oral TID    fish oil-omega-3 fatty acids 340-1,000 mg capsule 1 Cap  1 Cap Oral DAILY    lisinopril (PRINIVIL, ZESTRIL) tablet 20 mg  20 mg Oral DAILY    hydroCHLOROthiazide (HYDRODIURIL) tablet 25 mg  25 mg Oral DAILY          ASSESSMENT & PLAN     DIAGNOSES REQUIRING ACTIVE TREATMENT AND MONITORING: (reviewed/updated 1/25/2018)  Patient Active Hospital Problem List:   Bipolar 1 disorder ([facility] [address] (1/17/2018)    Assessment: reid alternating with depression     Plan: lithum and haldol  Monitor lithium level      Back pain  Assessment: muscle sprain  Plan: Tizanidine            I will continue to monitor blood levels (Depakote, Tegretol, lithium, clozapine---a drug with a narrow therapeutic index= NTI) and associated labs for drug therapy implemented that require intense monitoring for toxicity as deemed appropriate based on current medication side effects and pharmacodynamically determined drug 1/2 lives. In summary, Corinne Norwood, is a 48 y.o.  female who presents with a severe exacerbation of the principal diagnosis of Bipolar 1 disorder ([facility] [address]  Patient's condition is worsening/not improving/not stable . Patient requires continued inpatient hospitalization for further stabilization, safety monitoring and medication management. I will continue to coordinate the provision of individual, milieu, occupational, group, and substance abuse therapies to address target symptoms/diagnoses as deemed appropriate for the individual patient. A coordinated, multidisplinary treatment team round was conducted with the patient (this team consists of the nurse, psychiatric unit pharmcist,  and writer). Complete current electronic health record for patient has been reviewed today including consultant notes, ancillary staff notes, nurses and psychiatric tech notes. Suicide risk assessment completed and patient deemed to be of low risk for suicide at this time. The following regarding medications was addressed during rounds with patient:   the risks and benefits of the proposed medication. The patient was given the opportunity to ask questions. Informed consent given to the use of the above medications. Will continue to adjust psychiatric and non-psychiatric medications (see above \"medication\" section and orders section for details) as deemed appropriate & based upon diagnoses and response to treatment. I will continue to order blood tests/labs and diagnostic tests as deemed appropriate and review results as they become available (see orders for details and above listed lab/test results).     I will order psychiatric records from previous [facility] hospitals to further elucidate the nature of patient's psychopathology and review once available. I will gather additional collateral information from friends, family and o/p treatment team to further elucidate the nature of patient's psychopathology and baselline level of psychiatric functioning. I certify that this patient's inpatient psychiatric hospital services furnished since the previous certification were, and continue to be, required for treatment that could reasonably be expected to improve the patient's condition, or for diagnostic study, and that the patient continues to need, on a daily basis, active treatment furnished directly by or requiring the supervision of inpatient psychiatric facility personnel. In addition, the hospital records show that services furnished were intensive treatment services, admission or related services, or equivalent services.     EXPECTED DISCHARGE DATE/DAY: TBD     DISPOSITION: Home       Signed By:   Gloria Mendoza MD  1/25/2018

## 2018-01-26 PROCEDURE — 74011250637 HC RX REV CODE- 250/637: Performed by: PSYCHIATRY & NEUROLOGY

## 2018-01-26 PROCEDURE — 65220000003 HC RM SEMIPRIVATE PSYCH

## 2018-01-26 RX ORDER — HALOPERIDOL 10 MG/1
20 TABLET ORAL
Status: DISCONTINUED | OUTPATIENT
Start: 2018-01-26 | End: 2018-01-29

## 2018-01-26 RX ORDER — DIVALPROEX SODIUM 500 MG/1
500 TABLET, EXTENDED RELEASE ORAL
Status: DISCONTINUED | OUTPATIENT
Start: 2018-01-26 | End: 2018-01-29

## 2018-01-26 RX ADMIN — HYDROCHLOROTHIAZIDE 25 MG: 25 TABLET ORAL at 09:03

## 2018-01-26 RX ADMIN — LITHIUM CARBONATE 300 MG: 300 TABLET ORAL at 09:03

## 2018-01-26 RX ADMIN — LITHIUM CARBONATE 300 MG: 300 TABLET ORAL at 16:21

## 2018-01-26 RX ADMIN — DIVALPROEX SODIUM 500 MG: 500 TABLET, FILM COATED, EXTENDED RELEASE ORAL at 21:11

## 2018-01-26 RX ADMIN — LISINOPRIL 20 MG: 20 TABLET ORAL at 09:03

## 2018-01-26 RX ADMIN — IBUPROFEN 400 MG: 400 TABLET, FILM COATED ORAL at 19:01

## 2018-01-26 RX ADMIN — HALOPERIDOL 20 MG: 10 TABLET ORAL at 21:11

## 2018-01-26 RX ADMIN — Medication 1 CAPSULE: at 09:03

## 2018-01-26 RX ADMIN — ACETAMINOPHEN 650 MG: 325 TABLET, FILM COATED ORAL at 09:03

## 2018-01-26 RX ADMIN — LITHIUM CARBONATE 300 MG: 300 TABLET ORAL at 21:11

## 2018-01-26 NOTE — BH NOTES
PSYCHIATRIC PROGRESS NOTE         Patient Name  Corinne Norwood   Date of Birth 1965   [facility] [id number]   Medical Record Number  [id number]      Age  48 y.o. PCP None   Admit date:  1/17/2018    Room Number  734/01  @ [facility]   Date of Service  1/26/2018          PSYCHOTHERAPY SESSION NOTE:  Length of psychotherapy session: 15 minutes    Main condition/diagnosis/issues treated during session today, 1/26/2018 : bipolar disorder, poverty. Seeking resource. I employed Cognitive Behavioral therapy techniques, Reality-Oriented psychotherapy, as well as supportive psychotherapy in regards to various ongoing psychosocial stressors, including the following: pre-admission and current problems; housing issues; occupational issues;medical issues; and stress of hospitalization. Interpersonal relationship issues and psychodynamic conflicts explored. Attempts made to alleviate maladaptive patterns. We, also, worked on issues of denial & effects of substance dependency/use     Overall, patient is not progressing    Treatment Plan Update (reviewed an updated 1/26/2018) : I will modify psychotherapy tx plan by implementing more stress management strategies, building upon cognitive behavioral techniques, increasing coping skills, as well as shoring up psychological defenses). An extended energy and skill set was needed to engage pt in psychotherapy due to some of the following: resistiveness, complexity, negativity, confrontational nature, hostile behaviors, and/or severe abnormalities in thought processes/psychosis resulting in the loss of expressive/receptive language communication skills. E & M PROGRESS NOTE:         HISTORY       CC:  \"poor self care \"  HISTORY OF PRESENT ILLNESS/INTERVAL HISTORY:  (reviewed/updated 1/26/2018).   per initial evaluation:     Corinne Norwood presents/reports/evidences the following emotional symptoms today, 1/26/2018:psychotic behavior and manic behavior. The above symptoms have been present for several days . These symptoms are of severe severity. The symptoms are constant  in nature. Additional symptomatology and features include anxiety, physical complaints. Continued reid. She says that she plans to stay on medications after discharge    1/22/18- Repetative, chatty with mildly pressured speech. Poor insight . thnking still disordered  1/23/18- Still mildly disorganized with poor insight. Has poor self care skills, will increase Haldol  1/24/18- Still showing poor understanding of what she needs to do to live independently. No longer kushal. Pleasant  1/25/17- Very paranoid about releasing contact information. Persevorates on her financial limitations but has no insight abou what a  could help her with. Still disorganized thoughts. Will increase haldol. 1/26/18- {ersevorative thinking with tangential associations and paranoid ideation. Speech still pressured. Will add depakote                SIDE EFFECTS: (reviewed/updated 1/26/2018)  None reported or admitted to. No noted toxicity with use of lithium   ALLERGIES:(reviewed/updated 1/26/2018)  No Known Allergies   MEDICATIONS PRIOR TO ADMISSION:(reviewed/updated 1/26/2018)  Prescriptions Prior to Admission   Medication Sig    lisinopril-hydroCHLOROthiazide (PRINZIDE, ZESTORETIC) 20-25 mg per tablet Take 1 Tab by mouth daily.  fish oil-omega-3 fatty acids 340-1,000 mg capsule Take 1 Cap by mouth daily.  therapeutic multivitamin (THERAGRAN) tablet Take 1 Tab by mouth daily. PAST MEDICAL HISTORY: Past medical history from the initial psychiatric evaluation has been reviewed (reviewed/updated 1/26/2018) with no additional updates (I asked patient and no additional past medical history provided).    Past Medical History:   Diagnosis Date    Arthritis     Bilateral knees    Hallucination     Paranoia ([facility]     Psychiatric disorder     anxiety,bipolar   No past surgical history on file.   SOCIAL HISTORY: Social history from the initial psychiatric evaluation has been reviewed (reviewed/updated 1/26/2018) with no additional updates (I asked patient and no additional social history provided). Social History     Social History    Marital status: SINGLE     Spouse name: N/A    Number of children: N/A    Years of education: N/A     Occupational History    Not on file. Social History Main Topics    Smoking status: Heavy Tobacco Smoker    Smokeless tobacco: Former User    Alcohol use No    Drug use: No    Sexual activity: Not on file     Other Topics Concern    Not on file     Social History Narrative    50year old AA female admitted on TDO for walking out of her paid apt. And stating she wants to be homeless because \"people have been following me and tazing me. \" Pt has severe financial problems and this has been a huge stressor. She is not on disability but is inn subsidized housing. She has had follow up at the [employer] [facility]- but has not always complied due to transportation problem. FAMILY HISTORY: Family history from the initial psychiatric evaluation has been reviewed (reviewed/updated 1/26/2018) with no additional updates (I asked patient and no additional family history provided). No family history on file.     REVIEW OF SYSTEMS: (reviewed/updated 1/26/2018)  Appetite:no change from normal   Sleep: decreased more than normal   All other Review of Systems: Psychological ROS: positive for - anxiety  Respiratory ROS: no cough, shortness of breath, or wheezing  Cardiovascular ROS: no chest pain or dyspnea on exertion         [facility] (MSE):    MSE FINDINGS ARE WITHIN NORMAL LIMITS (WNL) UNLESS OTHERWISE STATED BELOW. ( ALL OF THE BELOW CATEGORIES OF THE MSE HAVE BEEN REVIEWED (reviewed 1/26/2018) AND UPDATED AS DEEMED APPROPRIATE )  General Presentation age appropriate, cooperative   Orientation oriented to time, place and person Vital Signs  See below (reviewed 1/26/2018); Vital Signs (BP, Pulse, & Temp) are within normal limits if not listed below. Gait and Station Stable/steady, no ataxia   Musculoskeletal System No extrapyramidal symptoms (EPS); no abnormal muscular movements or Tardive Dyskinesia (TD); muscle strength and tone are within normal limits   Language No aphasia or dysarthria   Speech:  normal pitch   Thought Processes concrete; normal rate of thoughts; fair abstract reasoning/computation   Thought Associations goal directed   Thought Content paranoid delusions   Suicidal Ideations none   Homicidal Ideations none   Mood:  stable    Affect:  mood-congruent   Memory recent  fair   Memory remote:  fair   Concentration/Attention:  distractable   Fund of Knowledge average   Insight:  good   Reliability poor   Judgment:  limited          VITALS:     Patient Vitals for the past 24 hrs:   Temp Pulse Resp BP SpO2   01/26/18 0800 98.3 °F (36.8 °C) 80 16 111/76 99 %   01/25/18 1951 98.4 °F (36.9 °C) 85 18 118/76 100 %   01/25/18 1610 98 °F (36.7 °C) 83 18 118/65 97 %     Wt Readings from Last 3 Encounters:   01/18/18 89.8 kg (198 lb)   01/15/18 90.7 kg (200 lb)     Temp Readings from Last 3 Encounters:   01/26/18 98.3 °F (36.8 °C)   01/15/18 98.6 °F (37 °C)   10/11/17 98.5 °F (36.9 °C)     BP Readings from Last 3 Encounters:   01/26/18 111/76   01/15/18 132/67   10/11/17 142/78     Pulse Readings from Last 3 Encounters:   01/26/18 80   01/15/18 80   10/11/17 70            DATA     LABORATORY DATA:(reviewed/updated 1/26/2018)  No results found for this or any previous visit (from the past 24 hour(s)). No results found for: VALF2, VALAC, VALP, VALPR, DS6, CRBAM, CRBAMP, CARB2, XCRBAM  Lab Results   Component Value Date/Time    Lithium level 0.83 01/23/2018 05:58 AM      RADIOLOGY REPORTS:(reviewed/updated 1/26/2018)  No results found.        MEDICATIONS     ALL MEDICATIONS:   Current Facility-Administered Medications   Medication Dose Route Frequency    haloperidol (HALDOL) tablet 20 mg  20 mg Oral QHS    divalproex ER (DEPAKOTE ER) 24 hour tablet 500 mg  500 mg Oral QHS    lithium carbonate tablet 300 mg  300 mg Oral TID    fish oil-omega-3 fatty acids 340-1,000 mg capsule 1 Cap  1 Cap Oral DAILY    tiZANidine (ZANAFLEX) tablet 2 mg  2 mg Oral Q6H PRN    zolpidem (AMBIEN) tablet 10 mg  10 mg Oral QHS PRN    lisinopril (PRINIVIL, ZESTRIL) tablet 20 mg  20 mg Oral DAILY    hydroCHLOROthiazide (HYDRODIURIL) tablet 25 mg  25 mg Oral DAILY    ziprasidone (GEODON) 20 mg in sterile water (preservative free) 1 mL injection  20 mg IntraMUSCular BID PRN    OLANZapine (ZyPREXA) tablet 5 mg  5 mg Oral Q6H PRN    benztropine (COGENTIN) tablet 2 mg  2 mg Oral BID PRN    benztropine (COGENTIN) injection 2 mg  2 mg IntraMUSCular BID PRN    LORazepam (ATIVAN) injection 2 mg  2 mg IntraMUSCular Q4H PRN    LORazepam (ATIVAN) tablet 1 mg  1 mg Oral Q4H PRN    acetaminophen (TYLENOL) tablet 650 mg  650 mg Oral Q4H PRN    ibuprofen (MOTRIN) tablet 400 mg  400 mg Oral Q8H PRN    magnesium hydroxide (MILK OF MAGNESIA) 400 mg/5 mL oral suspension 30 mL  30 mL Oral DAILY PRN    nicotine (NICODERM CQ) 21 mg/24 hr patch 1 Patch  1 Patch TransDERmal DAILY PRN      SCHEDULED MEDICATIONS:   Current Facility-Administered Medications   Medication Dose Route Frequency    haloperidol (HALDOL) tablet 20 mg  20 mg Oral QHS    divalproex ER (DEPAKOTE ER) 24 hour tablet 500 mg  500 mg Oral QHS    lithium carbonate tablet 300 mg  300 mg Oral TID    fish oil-omega-3 fatty acids 340-1,000 mg capsule 1 Cap  1 Cap Oral DAILY    lisinopril (PRINIVIL, ZESTRIL) tablet 20 mg  20 mg Oral DAILY    hydroCHLOROthiazide (HYDRODIURIL) tablet 25 mg  25 mg Oral DAILY          ASSESSMENT & PLAN     DIAGNOSES REQUIRING ACTIVE TREATMENT AND MONITORING: (reviewed/updated 1/26/2018)  Patient Active Hospital Problem List:   B[facility] ([facility] (1/17/2018)    Assessment: reid alternating with depression     Plan: lithum and haldol  Monitor lithium level      Back pain  Assessment: muscle sprain  Plan: Tizanidine            I will continue to monitor blood levels (Depakote, Tegretol, lithium, clozapine---a drug with a narrow therapeutic index= NTI) and associated labs for drug therapy implemented that require intense monitoring for toxicity as deemed appropriate based on current medication side effects and pharmacodynamically determined drug 1/2 lives. In summary, Kellie Everett, is a 48 y.o.  female who presents with a severe exacerbation of the principal diagnosis of Bipolar 1 disorder ([facility]  Patient's condition is worsening/not improving/not stable . Patient requires continued inpatient hospitalization for further stabilization, safety monitoring and medication management. I will continue to coordinate the provision of individual, milieu, occupational, group, and substance abuse therapies to address target symptoms/diagnoses as deemed appropriate for the individual patient. A coordinated, multidisplinary treatment team round was conducted with the patient (this team consists of the nurse, psychiatric unit pharmcist,  and writer). Complete current electronic health record for patient has been reviewed today including consultant notes, ancillary staff notes, nurses and psychiatric tech notes. Suicide risk assessment completed and patient deemed to be of low risk for suicide at this time. The following regarding medications was addressed during rounds with patient:   the risks and benefits of the proposed medication. The patient was given the opportunity to ask questions. Informed consent given to the use of the above medications. Will continue to adjust psychiatric and non-psychiatric medications (see above \"medication\" section and orders section for details) as deemed appropriate & based upon diagnoses and response to treatment.      I will continue to order blood tests/labs and diagnostic tests as deemed appropriate and review results as they become available (see orders for details and above listed lab/test results). I will order psychiatric records from previous [facility] hospitals to further elucidate the nature of patient's psychopathology and review once available. I will gather additional collateral information from friends, family and o/p treatment team to further elucidate the nature of patient's psychopathology and baselline level of psychiatric functioning. I certify that this patient's inpatient psychiatric hospital services furnished since the previous certification were, and continue to be, required for treatment that could reasonably be expected to improve the patient's condition, or for diagnostic study, and that the patient continues to need, on a daily basis, active treatment furnished directly by or requiring the supervision of inpatient psychiatric facility personnel. In addition, the hospital records show that services furnished were intensive treatment services, admission or related services, or equivalent services.     EXPECTED DISCHARGE DATE/DAY: TBD     DISPOSITION: Home       Signed By:   Shannon Christian MD  1/26/2018

## 2018-01-26 NOTE — BH NOTES
GROUP THERAPY PROGRESS NOTE    Nicolas Ashby participated in the Acute Unit's afternoon Process Group, with a focus on identifying feelings, planning for the rest of the day, and preparing for discharge. Group time: 55 minutes. Personal goal for participation: To increase the capacity to improve ones mood and structure. Goal orientation: The patient will be able to identify their feelings, develop a plan for structuring their day, and discharge planning. Group therapy participation: With prompting, this patient passively participated in most of the group. Therapeutic interventions reviewed and discussed: The group members were asked to introduce themselves to each other and to see if they could identify an emotion they are having and/or let the group know what they want to focus on for the day as they continue to make discharge plans. Impression of participation: The patient listened to her peers speak up before she indicated, \"I do not want to share today. \" She finally did admit that she wanted to leave the hospital. She was asked if she had spoken with her attending psychiatrist about leaving and she responded, \"Who is my doctor? \" She was told she could ask a member of the nursing staff and/or wait until her next treatment plan to make sure she knew who her inpatient psychiatrist is. The patient expressed no SI/HI and displayed no overt psychotic symptoms in this group. Her thinking, however,  shows poor judgment and problem solving skills. Her affect does not appear quite as anxious as she was on admission but may still be slightly paranoid. Her mood matched her affect. She needs to be encouraged to speak in social gatherings to see if she can tolerate interacting in a less awkward fashion.

## 2018-01-26 NOTE — PROGRESS NOTES
PRN Medication Documentation    Specific patient behavior that led to need for PRN medication: c/o 3/10 aching pain to upper right back onset now   Staff interventions attempted prior to PRN being given: education, reposition  PRN medication given: po 650 mg tylenol   Patient response/effectiveness of PRN medication: pt alert oriented

## 2018-01-26 NOTE — BH NOTES
GROUP THERAPY PROGRESS NOTE    Daphne Zaire is participating in [place]. Group time: 10 minutes    Personal goal for participation: To be a better person. Goal orientation: personal    Group therapy participation: active    Therapeutic interventions reviewed and discussed: Writer listened attentively and explained unit routine. Impression of participation: Patient participated actively.

## 2018-01-26 NOTE — PROGRESS NOTES
Problem: Falls - Risk of  Goal: *Absence of Falls  Document Kendra Fall Risk and appropriate interventions in the flowsheet. Outcome: Progressing Towards Goal  Received patient laying quietly in bed. Respirations are equal and unlabored. Patient in NAD.  Continue to monitor Q 15 minutes for safety    24 hour chart check complete
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
## 2018-01-26 NOTE — PROGRESS NOTES
Problem: Anxiety-Behavioral Health (Adult/Pediatric)  Goal: *STG: Attends activities and groups  Outcome: Progressing Towards Goal  Pt attending all scheduled groups and activities. Pt is medication and meal compliant. Pt remains on safety checks every 15 minutes. She expresses a desire to be discharged. She is also concerned with how her hospitalization will be paid because she states she does not have any insurance.

## 2018-01-26 NOTE — BH NOTES
Pt is present for group. Pt is fully attentive to tasks and shares expectations. Pt exerts intentions and goals for progression.

## 2018-01-26 NOTE — PROGRESS NOTES
Problem: Anxiety-Behavioral Health (Adult/Pediatric)  Goal: *STG: Remains safe in hospital  Outcome: Progressing Towards Goal  Pt remains safe in hospital on q 15 min safety checks. Pt disorganized thoughts, tangential speech. Attending groups. Medication and meal compliant. Pleasant.

## 2018-01-26 NOTE — BH NOTES
PRN Medication Documentation    Specific patient behavior that led to need for PRN medication: c/o insomnia  Staff interventions attempted prior to PRN being given: lights off in room, milieu quiet  PRN medication given: Ambien 10 mg po  Patient response/effectiveness of PRN medication: continue to monitor.

## 2018-01-27 PROCEDURE — 74011250637 HC RX REV CODE- 250/637: Performed by: PSYCHIATRY & NEUROLOGY

## 2018-01-27 PROCEDURE — 65220000003 HC RM SEMIPRIVATE PSYCH

## 2018-01-27 RX ADMIN — HYDROCHLOROTHIAZIDE 25 MG: 25 TABLET ORAL at 08:43

## 2018-01-27 RX ADMIN — ACETAMINOPHEN 650 MG: 325 TABLET, FILM COATED ORAL at 17:18

## 2018-01-27 RX ADMIN — LITHIUM CARBONATE 300 MG: 300 TABLET ORAL at 16:46

## 2018-01-27 RX ADMIN — DIVALPROEX SODIUM 500 MG: 500 TABLET, FILM COATED, EXTENDED RELEASE ORAL at 22:00

## 2018-01-27 RX ADMIN — LITHIUM CARBONATE 300 MG: 300 TABLET ORAL at 22:00

## 2018-01-27 RX ADMIN — Medication 1 CAPSULE: at 08:43

## 2018-01-27 RX ADMIN — LISINOPRIL 20 MG: 20 TABLET ORAL at 08:44

## 2018-01-27 RX ADMIN — HALOPERIDOL 20 MG: 10 TABLET ORAL at 21:31

## 2018-01-27 RX ADMIN — LITHIUM CARBONATE 300 MG: 300 TABLET ORAL at 08:44

## 2018-01-27 RX ADMIN — ZOLPIDEM TARTRATE 10 MG: 10 TABLET ORAL at 21:33

## 2018-01-27 NOTE — PROGRESS NOTES
Problem: Psychosis  Goal: *STG: Remains safe in hospital  Outcome: Progressing Towards Goal  Patient is alert, calm and cooperative. Sitting in her room; interacting with roommate. Greeted staff with a smile.    Patient remains safe in hospital.

## 2018-01-27 NOTE — PROGRESS NOTES
Problem: Anxiety-Behavioral Health (Adult/Pediatric)  Meet by 2/4/18  Goal: *STG: Remains safe in hospital  Outcome: Progressing Towards Goal  Pt alert and oriented. Calm cooperative pleasant. Smiling using humor appropriately. Pt verbalizing needs appropriately. Improved thought process observed today. Pt medication and meal compliant. Primary coping skills: humor, singing, reading bible. Improved insight observed today.      [facility]  Master Treatment Plan for Lorna Marcano    Date Treatment Plan Initiated: 1/27/18    Treatment Plan Modalities:  Type of Modality Amount  (x minutes) Frequency (x/week) Duration (x days) Name of Responsible Staff   [facility] meetings to encourage peer interactions 15 7 1   Daniel GARCIA   Group psychotherapy to assist in building coping skills and internal controls 60 7 1 Willie Puente LCSW   Therapeutic activity groups to build coping skills 60 7 1 Willie Puente LCSW   Psychoeducation in group setting to address:   Medication education   [phone] skills         Relaxation techniques         Symptom management         Discharge planning         Spirituality    60 [address] [place]   60 1 1 Volunteer from [employer]   Recovery/AA/NA   61 3 1 Volunteer from [address] [place] [address] medication management   15 7 1 Dr. Kevin Chaney

## 2018-01-27 NOTE — INTERDISCIPLINARY ROUNDS
Behavioral Health Interdisciplinary Rounds     Patient Name: Daphne Tai  Age: 48 y.o.   Room/Bed:  [facility]/  Primary Diagnosis: Bipolar 1 disorder (HCC)   Admission Status: Voluntary     Readmission within 30 days: no  Power of  in place: no  Patient requires a blocked bed: no          Reason for blocked bed:     VTE Prophylaxis: Not indicated  Flu vaccine given : yes - PTA  Mobility needs/Fall risk: no    Nutritional Plan: no  Consults:          Labs/Testing due today?: no    Sleep hours: 8.5       Participation in Care/Groups:  yes  Medication Compliant?: Yes  PRNS (last 24 hours): Pain    Restraints (last 24 hours):  no  Substance Abuse:  no  CIWA (range last 24 hours):  COWS (range last 24 hours):   Alcohol screening (AUDIT) completed -  AUDIT Score: 0  If applicable, date SBIRT discussed in treatment team AND documented:   Tobacco - patient is a smoker: no   Date tobacco education completed by RN: n/a  24 hour chart check complete: yes     Patient goal(s) for today:   Treatment team focus/goals:   Progress note     LOS:  10  Expected LOS:     Financial concerns/prescription coverage:    Date of last family contact:       Family requesting physician contact today:    Discharge plan:   Guns in the home:         Outpatient provider(s):     Participating treatment team members: Daphne Tai, * (assigned SW),

## 2018-01-27 NOTE — BH NOTES
PSYCHIATRIC PROGRESS NOTE         Patient Name  Nicolas Ashby   Date of Birth 1965   CSN [id number]   Medical Record Number  [id number]      Age  48 y.o. PCP None   Admit date:  1/17/2018    Room Number  734/01  @ [facility]   Date of Service  1/27/2018          PSYCHOTHERAPY SESSION NOTE:  Length of psychotherapy session: 15 minutes    Main condition/diagnosis/issues treated during session today, 1/27/2018 : bipolar disorder, poverty. Seeking resource. I employed Cognitive Behavioral therapy techniques, Reality-Oriented psychotherapy, as well as supportive psychotherapy in regards to various ongoing psychosocial stressors, including the following: pre-admission and current problems; housing issues; occupational issues;medical issues; and stress of hospitalization. Interpersonal relationship issues and psychodynamic conflicts explored. Attempts made to alleviate maladaptive patterns. We, also, worked on issues of denial & effects of substance dependency/use     Overall, patient is not progressing    Treatment Plan Update (reviewed an updated 1/27/2018) : I will modify psychotherapy tx plan by implementing more stress management strategies, building upon cognitive behavioral techniques, increasing coping skills, as well as shoring up psychological defenses). An extended energy and skill set was needed to engage pt in psychotherapy due to some of the following: resistiveness, complexity, negativity, confrontational nature, hostile behaviors, and/or severe abnormalities in thought processes/psychosis resulting in the loss of expressive/receptive language communication skills. E & M PROGRESS NOTE:         HISTORY       CC:  \"poor self care \"  HISTORY OF PRESENT ILLNESS/INTERVAL HISTORY:  (reviewed/updated 1/27/2018).   per initial evaluation:     Nicolas Ashby presents/reports/evidences the following emotional symptoms today, 1/27/2018:psychotic behavior and manic behavior. The above symptoms have been present for several days . These symptoms are of severe severity. The symptoms are constant  in nature. Additional symptomatology and features include anxiety, physical complaints. Continued reid. She says that she plans to stay on medications after discharge    1/22/18- Repetative, chatty with mildly pressured speech. Poor insight . thnking still disordered  1/23/18- Still mildly disorganized with poor insight. Has poor self care skills, will increase Haldol  1/24/18- Still showing poor understanding of what she needs to do to live independently. No longer kushal. Pleasant  1/25/17- Very paranoid about releasing contact information. Persevorates on her financial limitations but has no insight abou what a  could help her with. Still disorganized thoughts. Will increase haldol. 1/26/18- {ersevorative thinking with tangential associations and paranoid ideation. Speech still pressured. Will add depakote    1/27/18 she still paranoid and disorganized and she is compliant with medications and no anger issues and no self harm behavior and no issues with self harm                 SIDE EFFECTS: (reviewed/updated 1/27/2018)  None reported or admitted to. No noted toxicity with use of lithium   ALLERGIES:(reviewed/updated 1/27/2018)  No Known Allergies   MEDICATIONS PRIOR TO ADMISSION:(reviewed/updated 1/27/2018)  Prescriptions Prior to Admission   Medication Sig    lisinopril-hydroCHLOROthiazide (PRINZIDE, ZESTORETIC) 20-25 mg per tablet Take 1 Tab by mouth daily.  fish oil-omega-3 fatty acids 340-1,000 mg capsule Take 1 Cap by mouth daily.  therapeutic multivitamin (THERAGRAN) tablet Take 1 Tab by mouth daily. PAST MEDICAL HISTORY: Past medical history from the initial psychiatric evaluation has been reviewed (reviewed/updated 1/27/2018) with no additional updates (I asked patient and no additional past medical history provided).    Past Medical History: Diagnosis Date    Arthritis     Bilateral knees    Hallucination     Paranoia ([facility] [address]     Psychiatric disorder     anxiety,bipolar   No past surgical history on file. SOCIAL HISTORY: Social history from the initial psychiatric evaluation has been reviewed (reviewed/updated 1/27/2018) with no additional updates (I asked patient and no additional social history provided). Social History     Social History    Marital status: SINGLE     Spouse name: N/A    Number of children: N/A    Years of education: N/A     Occupational History    Not on file. Social History Main Topics    Smoking status: Heavy Tobacco Smoker    Smokeless tobacco: Former User    Alcohol use No    Drug use: No    Sexual activity: Not on file     Other Topics Concern    Not on file     Social History Narrative    50year old AA female admitted on TDO for walking out of her paid apt. And stating she wants to be homeless because \"people have been following me and tazing me. \" Pt has severe financial problems and this has been a huge stressor. She is not on disability but is inn subsidized housing. [facility] but has not always complied due to transportation problem. FAMILY HISTORY: Family history from the initial psychiatric evaluation has been reviewed (reviewed/updated 1/27/2018) with no additional updates (I asked patient and no additional family history provided). No family history on file.     REVIEW OF SYSTEMS: (reviewed/updated 1/27/2018)  Appetite:no change from normal   Sleep: decreased more than normal   All other Review of Systems: Psychological ROS: positive for - anxiety  Respiratory ROS: no cough, shortness of breath, or wheezing  Cardiovascular ROS: no chest pain or dyspnea on exertion         MENTAL STATUS EXAM & VITALS     MENTAL STATUS EXAM (MSE):    MSE FINDINGS ARE WITHIN NORMAL LIMITS (WNL) UNLESS OTHERWISE STATED BELOW. ( ALL OF THE BELOW CATEGORIES OF THE MSE HAVE BEEN REVIEWED (reviewed 1/27/2018) AND UPDATED AS DEEMED APPROPRIATE )  General Presentation age appropriate, cooperative   Orientation oriented to time, place and person   Vital Signs  See below (reviewed 1/27/2018); Vital Signs (BP, Pulse, & Temp) are within normal limits if not listed below. Gait and Station Stable/steady, no ataxia   Musculoskeletal System No extrapyramidal symptoms (EPS); no abnormal muscular movements or Tardive Dyskinesia (TD); muscle strength and tone are within normal limits   Language No aphasia or dysarthria   Speech:  normal pitch   Thought Processes concrete; normal rate of thoughts; fair abstract reasoning/computation   Thought Associations goal directed   Thought Content paranoid delusions   Suicidal Ideations none   Homicidal Ideations none   Mood:  stable    Affect:  mood-congruent   Memory recent  fair   Memory remote:  fair   Concentration/Attention:  distractable   Fund of Knowledge average   Insight:  good   Reliability poor   Judgment:  limited          VITALS:     Patient Vitals for the past 24 hrs:   Temp Pulse Resp BP SpO2   01/27/18 0757 98.3 °F (36.8 °C) 80 16 125/87 100 %   01/26/18 1942 97.6 °F (36.4 °C) 80 16 128/80 100 %   01/26/18 1559 97.9 °F (36.6 °C) 88 16 111/73 99 %     Wt Readings from Last 3 Encounters:   01/18/18 89.8 kg (198 lb)   01/15/18 90.7 kg (200 lb)     Temp Readings from Last 3 Encounters:   01/27/18 98.3 °F (36.8 °C)   01/15/18 98.6 °F (37 °C)   10/11/17 98.5 °F (36.9 °C)     BP Readings from Last 3 Encounters:   01/27/18 125/87   01/15/18 132/67   10/11/17 142/78     Pulse Readings from Last 3 Encounters:   01/27/18 80   01/15/18 80   10/11/17 70            DATA     LABORATORY DATA:(reviewed/updated 1/27/2018)  No results found for this or any previous visit (from the past 24 hour(s)).   No results found for: VALF2, VALAC, VALP, VALPR, DS6, CRBAM, CRBAMP, CARB2, XCRBAM  Lab Results   Component Value Date/Time    Lithium level 0.83 01/23/2018 05:58 AM RADIOLOGY REPORTS:(reviewed/updated 1/27/2018)  No results found.        MEDICATIONS     ALL MEDICATIONS:   Current Facility-Administered Medications   Medication Dose Route Frequency    haloperidol (HALDOL) tablet 20 mg  20 mg Oral QHS    divalproex ER (DEPAKOTE ER) 24 hour tablet 500 mg  500 mg Oral QHS    lithium carbonate tablet 300 mg  300 mg Oral TID    fish oil-omega-3 fatty acids 340-1,000 mg capsule 1 Cap  1 Cap Oral DAILY    tiZANidine (ZANAFLEX) tablet 2 mg  2 mg Oral Q6H PRN    zolpidem (AMBIEN) tablet 10 mg  10 mg Oral QHS PRN    lisinopril (PRINIVIL, ZESTRIL) tablet 20 mg  20 mg Oral DAILY    hydroCHLOROthiazide (HYDRODIURIL) tablet 25 mg  25 mg Oral DAILY    ziprasidone (GEODON) 20 mg in sterile water (preservative free) 1 mL injection  20 mg IntraMUSCular BID PRN    OLANZapine (ZyPREXA) tablet 5 mg  5 mg Oral Q6H PRN    benztropine (COGENTIN) tablet 2 mg  2 mg Oral BID PRN    benztropine (COGENTIN) injection 2 mg  2 mg IntraMUSCular BID PRN    LORazepam (ATIVAN) injection 2 mg  2 mg IntraMUSCular Q4H PRN    LORazepam (ATIVAN) tablet 1 mg  1 mg Oral Q4H PRN    acetaminophen (TYLENOL) tablet 650 mg  650 mg Oral Q4H PRN    ibuprofen (MOTRIN) tablet 400 mg  400 mg Oral Q8H PRN    magnesium hydroxide (MILK OF MAGNESIA) 400 mg/5 mL oral suspension 30 mL  30 mL Oral DAILY PRN    nicotine (NICODERM CQ) 21 mg/24 hr patch 1 Patch  1 Patch TransDERmal DAILY PRN      SCHEDULED MEDICATIONS:   Current Facility-Administered Medications   Medication Dose Route Frequency    haloperidol (HALDOL) tablet 20 mg  20 mg Oral QHS    divalproex ER (DEPAKOTE ER) 24 hour tablet 500 mg  500 mg Oral QHS    lithium carbonate tablet 300 mg  300 mg Oral TID    fish oil-omega-3 fatty acids 340-1,000 mg capsule 1 Cap  1 Cap Oral DAILY    lisinopril (PRINIVIL, ZESTRIL) tablet 20 mg  20 mg Oral DAILY    hydroCHLOROthiazide (HYDRODIURIL) tablet 25 mg  25 mg Oral DAILY          ASSESSMENT & PLAN     DIAGNOSES REQUIRING ACTIVE TREATMENT AND MONITORING: (reviewed/updated 1/27/2018)  Patient Active Hospital Problem List:   Bipolar 1 disorder ([facility] (1/17/2018)    Assessment: reid alternating with depression     Plan: lithum and haldol  Monitor lithium level      Back pain  Assessment: muscle sprain  Plan: Tizanidine    1/27/18 continue same medications             I will continue to monitor blood levels (Depakote, Tegretol, lithium, clozapine---a drug with a narrow therapeutic index= NTI) and associated labs for drug therapy implemented that require intense monitoring for toxicity as deemed appropriate based on current medication side effects and pharmacodynamically determined drug 1/2 lives. In summary, Jose Franklin, is a 48 y.o.  female who presents with a severe exacerbation of the principal diagnosis of Bipolar 1 disorder ([facility]  Patient's condition is worsening/not improving/not stable . Patient requires continued inpatient hospitalization for further stabilization, safety monitoring and medication management. I will continue to coordinate the provision of individual, milieu, occupational, group, and substance abuse therapies to address target symptoms/diagnoses as deemed appropriate for the individual patient. A coordinated, multidisplinary treatment team round was conducted with the patient (this team consists of the nurse, psychiatric unit pharmcist,  and writer). Complete current electronic health record for patient has been reviewed today including consultant notes, ancillary staff notes, nurses and psychiatric tech notes. Suicide risk assessment completed and patient deemed to be of low risk for suicide at this time. The following regarding medications was addressed during rounds with patient:   the risks and benefits of the proposed medication. The patient was given the opportunity to ask questions. Informed consent given to the use of the above medications.  Will continue to adjust psychiatric and non-psychiatric medications (see above \"medication\" section and orders section for details) as deemed appropriate & based upon diagnoses and response to treatment. I will continue to order blood tests/labs and diagnostic tests as deemed appropriate and review results as they become available (see orders for details and above listed lab/test results). I will order psychiatric records from previous [facility] hospitals to further elucidate the nature of patient's psychopathology and review once available. I will gather additional collateral information from friends, family and o/p treatment team to further elucidate the nature of patient's psychopathology and baselline level of psychiatric functioning. I certify that this patient's inpatient psychiatric hospital services furnished since the previous certification were, and continue to be, required for treatment that could reasonably be expected to improve the patient's condition, or for diagnostic study, and that the patient continues to need, on a daily basis, active treatment furnished directly by or requiring the supervision of inpatient psychiatric facility personnel. In addition, the hospital records show that services furnished were intensive treatment services, admission or related services, or equivalent services.     EXPECTED DISCHARGE DATE/DAY: TBD     DISPOSITION: Home       Signed By:   Casey Nunez MD  1/27/2018

## 2018-01-27 NOTE — PROGRESS NOTES
GROUP THERAPY PROGRESS NOTE    Narayan Markham is participating in [facility]. Group time: 30 minutes    Personal goal for participation:  To relax and to receive information on important times on the unit    Goal orientation: community    Group therapy participation: active    Therapeutic interventions reviewed and discussed: Provided information on daily schedule of the unit, provided coloring pages and word searches to promote relaxation before bedtime    Impression of participation: encouraged conversation, asked questions, utilized papers given to relax

## 2018-01-27 NOTE — BH NOTES
PRN Medication Documentation    Specific patient behavior that led to need for PRN medication: c/o lower back pain 3 of 10  Staff interventions attempted prior to PRN being given: self repositioning, stretching  PRN medication given: motrin 400 mg po   Patient response/effectiveness of PRN medication: upon reassessment, pt reports no pain. Prn effective.

## 2018-01-27 NOTE — PROGRESS NOTES
Problem: Anxiety-Behavioral Health (Adult/Pediatric)  Meet by 2/4/18  Goal: *STG: Attends activities and groups  Outcome: Progressing Towards Goal  Pt attends groups activities. Pt engaged in treatment. Goal: *STG/LTG: Complies with medication therapy  Outcome: Progressing Towards Goal  Pt compliant with medications. Verbalizes understanding of medications. Denies complaints. Pt reports broken sleep. Discussed use of temporary sleep aid to promote sleep.

## 2018-01-28 PROCEDURE — 65220000003 HC RM SEMIPRIVATE PSYCH

## 2018-01-28 PROCEDURE — 74011250637 HC RX REV CODE- 250/637: Performed by: PSYCHIATRY & NEUROLOGY

## 2018-01-28 RX ADMIN — LITHIUM CARBONATE 300 MG: 300 TABLET ORAL at 15:43

## 2018-01-28 RX ADMIN — DIVALPROEX SODIUM 500 MG: 500 TABLET, FILM COATED, EXTENDED RELEASE ORAL at 21:09

## 2018-01-28 RX ADMIN — LISINOPRIL 20 MG: 20 TABLET ORAL at 08:46

## 2018-01-28 RX ADMIN — LITHIUM CARBONATE 300 MG: 300 TABLET ORAL at 08:46

## 2018-01-28 RX ADMIN — HYDROCHLOROTHIAZIDE 25 MG: 25 TABLET ORAL at 08:46

## 2018-01-28 RX ADMIN — Medication 1 CAPSULE: at 08:46

## 2018-01-28 RX ADMIN — ACETAMINOPHEN 650 MG: 325 TABLET, FILM COATED ORAL at 15:43

## 2018-01-28 RX ADMIN — HALOPERIDOL 20 MG: 10 TABLET ORAL at 21:09

## 2018-01-28 RX ADMIN — LITHIUM CARBONATE 300 MG: 300 TABLET ORAL at 21:09

## 2018-01-28 NOTE — PROGRESS NOTES
Problem: Psychosis  Goal: *STG/LTG: Complies with medication therapy  Outcome: Progressing Towards Goal  Pt calm cooperative pleasant. Compliant with medications. Verbalizing understanding of medications. Prn po 650 mg tylenol given for right sided back pain onset now. Education provided. Pt tolerates well.

## 2018-01-28 NOTE — BH NOTES
PSYCHIATRIC PROGRESS NOTE         Patient Name  Daphne Tai   Date of Birth 1965   [facility] [id number]   Medical Record Number  [id number]      Age  48 y.o. PCP None   Admit date:  1/17/2018    Room Number  734/01  @ [facility]   Date of Service  1/28/2018          PSYCHOTHERAPY SESSION NOTE:  Length of psychotherapy session: 15 minutes    Main condition/diagnosis/issues treated during session today, 1/28/2018 : bipolar disorder, poverty. Seeking resource. I employed Cognitive Behavioral therapy techniques, Reality-Oriented psychotherapy, as well as supportive psychotherapy in regards to various ongoing psychosocial stressors, including the following: pre-admission and current problems; housing issues; occupational issues;medical issues; and stress of hospitalization. Interpersonal relationship issues and psychodynamic conflicts explored. Attempts made to alleviate maladaptive patterns. We, also, worked on issues of denial & effects of substance dependency/use     Overall, patient is not progressing    Treatment Plan Update (reviewed an updated 1/28/2018) : I will modify psychotherapy tx plan by implementing more stress management strategies, building upon cognitive behavioral techniques, increasing coping skills, as well as shoring up psychological defenses). An extended energy and skill set was needed to engage pt in psychotherapy due to some of the following: resistiveness, complexity, negativity, confrontational nature, hostile behaviors, and/or severe abnormalities in thought processes/psychosis resulting in the loss of expressive/receptive language communication skills. E & M PROGRESS NOTE:         HISTORY       CC:  \"poor self care \"  HISTORY OF PRESENT ILLNESS/INTERVAL HISTORY:  (reviewed/updated 1/28/2018).   per initial evaluation:     Daphne Tai presents/reports/evidences the following emotional symptoms today, 1/28/2018:psychotic behavior and manic behavior. The above symptoms have been present for several days . These symptoms are of severe severity. The symptoms are constant  in nature. Additional symptomatology and features include anxiety, physical complaints. Continued reid. She says that she plans to stay on medications after discharge    1/22/18- Repetative, chatty with mildly pressured speech. Poor insight . thnking still disordered  1/23/18- Still mildly disorganized with poor insight. Has poor self care skills, will increase Haldol  1/24/18- Still showing poor understanding of what she needs to do to live independently. No longer kushal. Pleasant  1/25/17- Very paranoid about releasing contact information. Persevorates on her financial limitations but has no insight abou what a  could help her with. Still disorganized thoughts. Will increase haldol. 1/26/18- {ersevorative thinking with tangential associations and paranoid ideation. Speech still pressured. Will add depakote    1/27/18 she still paranoid and disorganized and she is compliant with medications and no anger issues and no self harm behavior and no issues with self harm   1/28/18 she is doing the same and she is complaint with medications and no self harm behavior                 SIDE EFFECTS: (reviewed/updated 1/28/2018)  None reported or admitted to. No noted toxicity with use of lithium   ALLERGIES:(reviewed/updated 1/28/2018)  No Known Allergies   MEDICATIONS PRIOR TO ADMISSION:(reviewed/updated 1/28/2018)  Prescriptions Prior to Admission   Medication Sig    lisinopril-hydroCHLOROthiazide (PRINZIDE, ZESTORETIC) 20-25 mg per tablet Take 1 Tab by mouth daily.  fish oil-omega-3 fatty acids 340-1,000 mg capsule Take 1 Cap by mouth daily.  therapeutic multivitamin (THERAGRAN) tablet Take 1 Tab by mouth daily.       PAST MEDICAL HISTORY: Past medical history from the initial psychiatric evaluation has been reviewed (reviewed/updated 1/28/2018) with no additional updates (I asked patient and no additional past medical history provided). Past Medical History:   Diagnosis Date    Arthritis     Bilateral knees    Hallucination     Paranoia ([facility] [address]     Psychiatric disorder     anxiety,bipolar   No past surgical history on file. SOCIAL HISTORY: Social history from the initial psychiatric evaluation has been reviewed (reviewed/updated 1/28/2018) with no additional updates (I asked patient and no additional social history provided). Social History     Social History    Marital status: SINGLE     Spouse name: N/A    Number of children: N/A    Years of education: N/A     Occupational History    Not on file. Social History Main Topics    Smoking status: Heavy Tobacco Smoker    Smokeless tobacco: Former User    Alcohol use No    Drug use: No    Sexual activity: Not on file     Other Topics Concern    Not on file     Social History Narrative    50year old AA female admitted on TDO for walking out of her paid apt. And stating she wants to be homeless because \"people have been following me and tazing me. \" Pt has severe financial problems and this has been a huge stressor. She is not on disability but is inn subsidized housing. [facility] but has not always complied due to transportation problem. FAMILY HISTORY: Family history from the initial psychiatric evaluation has been reviewed (reviewed/updated 1/28/2018) with no additional updates (I asked patient and no additional family history provided). No family history on file.     REVIEW OF SYSTEMS: (reviewed/updated 1/28/2018)  Appetite:no change from normal   Sleep: decreased more than normal   All other Review of Systems: Psychological ROS: positive for - anxiety  Respiratory ROS: no cough, shortness of breath, or wheezing  Cardiovascular ROS: no chest pain or dyspnea on exertion         MENTAL STATUS EXAM & VITALS     MENTAL STATUS EXAM (MSE):    MSE FINDINGS ARE WITHIN NORMAL LIMITS (WNL) UNLESS OTHERWISE STATED BELOW. ( ALL OF THE BELOW CATEGORIES OF THE MSE HAVE BEEN REVIEWED (reviewed 1/28/2018) AND UPDATED AS DEEMED APPROPRIATE )  General Presentation age appropriate, cooperative   Orientation oriented to time, place and person   Vital Signs  See below (reviewed 1/28/2018); Vital Signs (BP, Pulse, & Temp) are within normal limits if not listed below. Gait and Station Stable/steady, no ataxia   Musculoskeletal System No extrapyramidal symptoms (EPS); no abnormal muscular movements or Tardive Dyskinesia (TD); muscle strength and tone are within normal limits   Language No aphasia or dysarthria   Speech:  normal pitch   Thought Processes concrete; normal rate of thoughts; fair abstract reasoning/computation   Thought Associations goal directed   Thought Content paranoid delusions   Suicidal Ideations none   Homicidal Ideations none   Mood:  stable    Affect:  mood-congruent   Memory recent  fair   Memory remote:  fair   Concentration/Attention:  distractable   Fund of Knowledge average   Insight:  good   Reliability poor   Judgment:  limited          VITALS:     Patient Vitals for the past 24 hrs:   Temp Pulse Resp BP SpO2   01/28/18 1536 98.5 °F (36.9 °C) 76 18 118/83 100 %   01/28/18 0846 98.6 °F (37 °C) 77 18 131/78 98 %   01/27/18 2012 97.7 °F (36.5 °C) 75 20 142/84 100 %     Wt Readings from Last 3 Encounters:   01/28/18 88.2 kg (194 lb 8 oz)   01/15/18 90.7 kg (200 lb)     Temp Readings from Last 3 Encounters:   01/28/18 98.5 °F (36.9 °C)   01/15/18 98.6 °F (37 °C)   10/11/17 98.5 °F (36.9 °C)     BP Readings from Last 3 Encounters:   01/28/18 118/83   01/15/18 132/67   10/11/17 142/78     Pulse Readings from Last 3 Encounters:   01/28/18 76   01/15/18 80   10/11/17 70            DATA     LABORATORY DATA:(reviewed/updated 1/28/2018)  No results found for this or any previous visit (from the past 24 hour(s)).   No results found for: VALF2, VALAC, VALP, VALPR, DS6, CRBAM, CRBAMP, CARB2, XCRBAM  Lab Results   Component Value Date/Time    Lithium level 0.83 01/23/2018 05:58 AM      RADIOLOGY REPORTS:(reviewed/updated 1/28/2018)  No results found.        MEDICATIONS     ALL MEDICATIONS:   Current Facility-Administered Medications   Medication Dose Route Frequency    haloperidol (HALDOL) tablet 20 mg  20 mg Oral QHS    divalproex ER (DEPAKOTE ER) 24 hour tablet 500 mg  500 mg Oral QHS    lithium carbonate tablet 300 mg  300 mg Oral TID    fish oil-omega-3 fatty acids 340-1,000 mg capsule 1 Cap  1 Cap Oral DAILY    tiZANidine (ZANAFLEX) tablet 2 mg  2 mg Oral Q6H PRN    zolpidem (AMBIEN) tablet 10 mg  10 mg Oral QHS PRN    lisinopril (PRINIVIL, ZESTRIL) tablet 20 mg  20 mg Oral DAILY    hydroCHLOROthiazide (HYDRODIURIL) tablet 25 mg  25 mg Oral DAILY    ziprasidone (GEODON) 20 mg in sterile water (preservative free) 1 mL injection  20 mg IntraMUSCular BID PRN    OLANZapine (ZyPREXA) tablet 5 mg  5 mg Oral Q6H PRN    benztropine (COGENTIN) tablet 2 mg  2 mg Oral BID PRN    benztropine (COGENTIN) injection 2 mg  2 mg IntraMUSCular BID PRN    LORazepam (ATIVAN) injection 2 mg  2 mg IntraMUSCular Q4H PRN    LORazepam (ATIVAN) tablet 1 mg  1 mg Oral Q4H PRN    acetaminophen (TYLENOL) tablet 650 mg  650 mg Oral Q4H PRN    ibuprofen (MOTRIN) tablet 400 mg  400 mg Oral Q8H PRN    magnesium hydroxide (MILK OF MAGNESIA) 400 mg/5 mL oral suspension 30 mL  30 mL Oral DAILY PRN    nicotine (NICODERM CQ) 21 mg/24 hr patch 1 Patch  1 Patch TransDERmal DAILY PRN      SCHEDULED MEDICATIONS:   Current Facility-Administered Medications   Medication Dose Route Frequency    haloperidol (HALDOL) tablet 20 mg  20 mg Oral QHS    divalproex ER (DEPAKOTE ER) 24 hour tablet 500 mg  500 mg Oral QHS    lithium carbonate tablet 300 mg  300 mg Oral TID    fish oil-omega-3 fatty acids 340-1,000 mg capsule 1 Cap  1 Cap Oral DAILY    lisinopril (PRINIVIL, ZESTRIL) tablet 20 mg  20 mg Oral DAILY    hydroCHLOROthiazide (HYDRODIURIL) tablet 25 mg  25 mg Oral DAILY          ASSESSMENT & PLAN     DIAGNOSES REQUIRING ACTIVE TREATMENT AND MONITORING: (reviewed/updated 1/28/2018)  Patient Active Hospital Problem List:   Bipolar 1 disorder ([facility] (1/17/2018)    Assessment: reid alternating with depression     Plan: lithum and haldol  Monitor lithium level      Back pain  Assessment: muscle sprain  Plan: Tizanidine    1/27/18 continue same medications   1/28/18 continue same medications             I will continue to monitor blood levels (Depakote, Tegretol, lithium, clozapine---a drug with a narrow therapeutic index= NTI) and associated labs for drug therapy implemented that require intense monitoring for toxicity as deemed appropriate based on current medication side effects and pharmacodynamically determined drug 1/2 lives. In summary, Jose Franklin, is a 48 y.o.  female who presents with a severe exacerbation of the principal diagnosis of Bipolar 1 disorder ([facility]  Patient's condition is worsening/not improving/not stable . Patient requires continued inpatient hospitalization for further stabilization, safety monitoring and medication management. I will continue to coordinate the provision of individual, milieu, occupational, group, and substance abuse therapies to address target symptoms/diagnoses as deemed appropriate for the individual patient. A coordinated, multidisplinary treatment team round was conducted with the patient (this team consists of the nurse, psychiatric unit pharmcist,  and writer). Complete current electronic health record for patient has been reviewed today including consultant notes, ancillary staff notes, nurses and psychiatric tech notes. Suicide risk assessment completed and patient deemed to be of low risk for suicide at this time.      The following regarding medications was addressed during rounds with patient:   the risks and benefits of the proposed medication. The patient was given the opportunity to ask questions. Informed consent given to the use of the above medications. Will continue to adjust psychiatric and non-psychiatric medications (see above \"medication\" section and orders section for details) as deemed appropriate & based upon diagnoses and response to treatment. I will continue to order blood tests/labs and diagnostic tests as deemed appropriate and review results as they become available (see orders for details and above listed lab/test results). I will order psychiatric records from previous [facility] hospitals to further elucidate the nature of patient's psychopathology and review once available. I will gather additional collateral information from friends, family and o/p treatment team to further elucidate the nature of patient's psychopathology and baselline level of psychiatric functioning. I certify that this patient's inpatient psychiatric hospital services furnished since the previous certification were, and continue to be, required for treatment that could reasonably be expected to improve the patient's condition, or for diagnostic study, and that the patient continues to need, on a daily basis, active treatment furnished directly by or requiring the supervision of inpatient psychiatric facility personnel. In addition, the hospital records show that services furnished were intensive treatment services, admission or related services, or equivalent services.     EXPECTED DISCHARGE DATE/DAY: TBD     DISPOSITION: Home       Signed By:   Patricia Mendoza MD  1/28/2018

## 2018-01-28 NOTE — PROGRESS NOTES
Problem: Anxiety-Behavioral Health (Adult/Pediatric)  Goal: *STG: Remains safe in hospital  Outcome: Progressing Towards Goal  Pt remains safe in hospital on q 15 min safety checks. Appears calm relaxed. Attending groups and activities. Denies concerns or complaints. Reports adequate rest. Goal oriented, improved thought process. Appropriate interactions observed today.

## 2018-01-28 NOTE — PROGRESS NOTES
Problem: Falls - Risk of  Goal: *Absence of Falls  Document Kendra Fall Risk and appropriate interventions in the flowsheet. Outcome: Progressing Towards Goal  Fall Risk Interventions:   Pt in bed asleep at start of shift. NAD. Respirations even and unlabored. No falls noted/reported. Q 15 minute checks for safety continued.          Medication Interventions: Teach patient to arise slowly

## 2018-01-28 NOTE — INTERDISCIPLINARY ROUNDS
Behavioral Health Interdisciplinary Rounds     Patient Name: Jackie Serrano  Age: 48 y.o.   Room/Bed:  [facility]/  Primary Diagnosis: Bipolar 1 disorder (HCC)   Admission Status: Voluntary     Readmission within 30 days: no  Power of  in place: no  Patient requires a blocked bed: no          Reason for blocked bed: n/a    VTE Prophylaxis: Not indicated  Flu vaccine given : yes - PTA  Mobility needs/Fall risk: no    Nutritional Plan: no  Consults:          Labs/Testing due today?: no    Sleep hours:  4.5      Participation in Care/Groups:  yes  Medication Compliant?: Yes  PRNS (last 24 hours): Sleep Aid and Pain    Restraints (last 24 hours):  no  Substance Abuse:  no  CIWA (range last 24 hours):  COWS (range last 24 hours):   Alcohol screening (AUDIT) completed -  AUDIT Score: 0  If applicable, date SBIRT discussed in treatment team AND documented:   Tobacco - patient is a smoker: no   Date tobacco education completed by RN: n/a  24 hour chart check complete: yes     Patient goal(s) for today:   Treatment team focus/goals:   Progress note     LOS:  11  Expected LOS:     Financial concerns/prescription coverage:    Date of last family contact:       Family requesting physician contact today:    Discharge plan:   Guns in the home:         Outpatient provider(s):     Participating treatment team members: Jackie Serrano, * (assigned SW),

## 2018-01-28 NOTE — BH NOTES
LEISURE GROUP THERAPY PROGRESS NOTE    The patient Jose Andrews a 48 y.o. female is participating in Leisure Group. Group time: 45 minutes    Personal goal for participation: Daily social interactions with other peers & staff.     Goal orientation: Relaxation activities    Group therapy participation: active    Therapeutic interventions reviewed and discussed:  Yes    Impression of participation: active participant    Matilde Aquiles  1/28/2018  2:29 PM

## 2018-01-28 NOTE — BH NOTES
GROUP THERAPY PROGRESS NOTE    Sofía Yee is participating in Reflections. Group time: 30 minutes    Personal goal for participation: relaxation    Goal orientation: personal    Group therapy participation: minimal    Therapeutic interventions reviewed and discussed: \"Yantra\" and \"Hygeine\" word search handouts. Impression of participation: Seems in fair spirits. Somewhat isolative and maintaining distance from peers. States prefers to avoid conflicts with others. Much Christian content to patent's comments and verbalizations.

## 2018-01-29 VITALS
SYSTOLIC BLOOD PRESSURE: 117 MMHG | BODY MASS INDEX: 26.38 KG/M2 | DIASTOLIC BLOOD PRESSURE: 73 MMHG | OXYGEN SATURATION: 99 % | HEART RATE: 81 BPM | RESPIRATION RATE: 16 BRPM | TEMPERATURE: 98.1 F | WEIGHT: 194.5 LBS

## 2018-01-29 LAB
ALBUMIN SERPL-MCNC: 3.6 G/DL (ref 3.5–5)
ALBUMIN/GLOB SERPL: 0.8 {RATIO} (ref 1.1–2.2)
ALP SERPL-CCNC: 80 U/L (ref 45–117)
ALT SERPL-CCNC: 20 U/L (ref 12–78)
ANION GAP SERPL CALC-SCNC: 5 MMOL/L (ref 5–15)
AST SERPL-CCNC: 11 U/L (ref 15–37)
BILIRUB SERPL-MCNC: 0.2 MG/DL (ref 0.2–1)
BUN SERPL-MCNC: 15 MG/DL (ref 6–20)
BUN/CREAT SERPL: 14 (ref 12–20)
CALCIUM SERPL-MCNC: 9.9 MG/DL (ref 8.5–10.1)
CHLORIDE SERPL-SCNC: 100 MMOL/L (ref 97–108)
CO2 SERPL-SCNC: 31 MMOL/L (ref 21–32)
CREAT SERPL-MCNC: 1.1 MG/DL (ref 0.55–1.02)
DATE LAST DOSE: NORMAL
ERYTHROCYTE [DISTWIDTH] IN BLOOD BY AUTOMATED COUNT: 13.2 % (ref 11.5–14.5)
GLOBULIN SER CALC-MCNC: 4.3 G/DL (ref 2–4)
GLUCOSE SERPL-MCNC: 96 MG/DL (ref 65–100)
HCT VFR BLD AUTO: 36.6 % (ref 35–47)
HGB BLD-MCNC: 11.8 G/DL (ref 11.5–16)
LITHIUM SERPL-SCNC: 1.18 MMOL/L (ref 0.6–1.2)
MCH RBC QN AUTO: 28.4 PG (ref 26–34)
MCHC RBC AUTO-ENTMCNC: 32.2 G/DL (ref 30–36.5)
MCV RBC AUTO: 88 FL (ref 80–99)
NRBC # BLD: 0 K/UL (ref 0–0.01)
NRBC BLD-RTO: 0 PER 100 WBC
PLATELET # BLD AUTO: 381 K/UL (ref 150–400)
PMV BLD AUTO: 9.5 FL (ref 8.9–12.9)
POTASSIUM SERPL-SCNC: 4.5 MMOL/L (ref 3.5–5.1)
PROT SERPL-MCNC: 7.9 G/DL (ref 6.4–8.2)
RBC # BLD AUTO: 4.16 M/UL (ref 3.8–5.2)
REPORTED DOSE,DOSE: NORMAL UNITS
REPORTED DOSE/TIME,TMG: NORMAL
SODIUM SERPL-SCNC: 136 MMOL/L (ref 136–145)
VALPROATE SERPL-MCNC: 25 UG/ML (ref 50–100)
WBC # BLD AUTO: 7.1 K/UL (ref 3.6–11)

## 2018-01-29 PROCEDURE — 80053 COMPREHEN METABOLIC PANEL: CPT | Performed by: PSYCHIATRY & NEUROLOGY

## 2018-01-29 PROCEDURE — 80164 ASSAY DIPROPYLACETIC ACD TOT: CPT | Performed by: PSYCHIATRY & NEUROLOGY

## 2018-01-29 PROCEDURE — 74011250637 HC RX REV CODE- 250/637: Performed by: PSYCHIATRY & NEUROLOGY

## 2018-01-29 PROCEDURE — 80178 ASSAY OF LITHIUM: CPT | Performed by: PSYCHIATRY & NEUROLOGY

## 2018-01-29 PROCEDURE — 74011250636 HC RX REV CODE- 250/636: Performed by: PSYCHIATRY & NEUROLOGY

## 2018-01-29 PROCEDURE — 36415 COLL VENOUS BLD VENIPUNCTURE: CPT | Performed by: PSYCHIATRY & NEUROLOGY

## 2018-01-29 PROCEDURE — 85027 COMPLETE CBC AUTOMATED: CPT | Performed by: PSYCHIATRY & NEUROLOGY

## 2018-01-29 RX ORDER — LISINOPRIL AND HYDROCHLOROTHIAZIDE 20; 25 MG/1; MG/1
1 TABLET ORAL DAILY
Qty: 30 TAB | Refills: 0 | Status: SHIPPED | OUTPATIENT
Start: 2018-01-29

## 2018-01-29 RX ORDER — DIVALPROEX SODIUM 500 MG/1
1000 TABLET, EXTENDED RELEASE ORAL
Qty: 60 TAB | Refills: 0 | Status: SHIPPED | OUTPATIENT
Start: 2018-01-29

## 2018-01-29 RX ORDER — HALOPERIDOL 10 MG/1
10 TABLET ORAL
Status: DISCONTINUED | OUTPATIENT
Start: 2018-01-29 | End: 2018-01-29 | Stop reason: HOSPADM

## 2018-01-29 RX ORDER — HALOPERIDOL DECANOATE 100 MG/ML
100 INJECTION INTRAMUSCULAR
Qty: 1 VIAL | Refills: 0 | Status: SHIPPED
Start: 2018-01-29

## 2018-01-29 RX ORDER — HALOPERIDOL 10 MG/1
10 TABLET ORAL
Qty: 30 TAB | Refills: 0 | Status: SHIPPED | OUTPATIENT
Start: 2018-01-29

## 2018-01-29 RX ORDER — LITHIUM CARBONATE 450 MG/1
900 TABLET ORAL
Qty: 60 TAB | Refills: 0 | Status: SHIPPED | OUTPATIENT
Start: 2018-01-29

## 2018-01-29 RX ORDER — HYDROCHLOROTHIAZIDE 25 MG/1
25 TABLET ORAL DAILY
Qty: 30 TAB | Refills: 0 | Status: SHIPPED | OUTPATIENT
Start: 2018-01-30 | End: 2018-01-29

## 2018-01-29 RX ORDER — HALOPERIDOL DECANOATE 50 MG/ML
50 INJECTION INTRAMUSCULAR
Status: DISCONTINUED | OUTPATIENT
Start: 2018-01-29 | End: 2018-01-29 | Stop reason: HOSPADM

## 2018-01-29 RX ORDER — DIVALPROEX SODIUM 500 MG/1
1000 TABLET, EXTENDED RELEASE ORAL
Status: DISCONTINUED | OUTPATIENT
Start: 2018-01-29 | End: 2018-01-29 | Stop reason: HOSPADM

## 2018-01-29 RX ORDER — LITHIUM CARBONATE 450 MG/1
900 TABLET ORAL
Status: DISCONTINUED | OUTPATIENT
Start: 2018-01-29 | End: 2018-01-29 | Stop reason: HOSPADM

## 2018-01-29 RX ADMIN — LITHIUM CARBONATE 300 MG: 300 TABLET ORAL at 08:58

## 2018-01-29 RX ADMIN — ACETAMINOPHEN 650 MG: 325 TABLET, FILM COATED ORAL at 11:36

## 2018-01-29 RX ADMIN — HALOPERIDOL DECANOATE 50 MG: 50 INJECTION INTRAMUSCULAR at 12:35

## 2018-01-29 RX ADMIN — LISINOPRIL 20 MG: 20 TABLET ORAL at 08:58

## 2018-01-29 RX ADMIN — Medication 1 CAPSULE: at 08:58

## 2018-01-29 RX ADMIN — HYDROCHLOROTHIAZIDE 25 MG: 25 TABLET ORAL at 08:58

## 2018-01-29 NOTE — DISCHARGE INSTRUCTIONS
DISCHARGE SUMMARY    Salvatore Arias  : 1965  MRN: [id number]    The patient Dorothy Larsen exhibits the ability to control behavior in a less restrictive environment. Patient's level of functioning is improving. No assaultive/destructive behavior has been observed for the past 24 hours. No suicidal/homicidal threat or behavior has been observed for the past 24 hours. There is no evidence of serious medication side effects. Patient has not been in physical or protective restraints for at least the past 24 hours. If weapons involved, how are they secured? N/A    Is patient aware of and in agreement with discharge plan? Yes    Arrangements for medication:  Two weeks of medication given to patient. Referral for substance abuse treatment ? N/A    Referral for smoking cessation needed ? N/A    Copy of discharge instructions to  provider?:  Yes. Fax: [phone]    Arrangements for transportation home:  Family to Transport    Keep all follow up appointments as scheduled, continue to take prescribed medications per physician instructions. Mental health crisis number:  [phone] or your local mental health crisis line number at [phone]        DISCHARGE SUMMARY from Nurse    PATIENT INSTRUCTIONS:  What to do at Home:  Recommended activity: Activity as tolerated. If you experience any of the following symptoms thoughts of harming self or others, increased overwhelming fear, overwhelming racing thoughts,  please call 911 or your local [facility] number [phone]. *  Please give a list of your current medications to your Primary Care Provider. *  Please update this list whenever your medications are discontinued, doses are      changed, or new medications (including over-the-counter products) are added. *  Please carry medication information at all times in case of emergency situations.     These are general instructions for a healthy lifestyle:    No smoking/ No tobacco products/ Avoid exposure to second hand smoke  Surgeon General's Warning:  Quitting smoking now greatly reduces serious risk to your health. Obesity, smoking, and sedentary lifestyle greatly increases your risk for illness    A healthy diet, regular physical exercise & weight monitoring are important for maintaining a healthy lifestyle    You may be retaining fluid if you have a history of heart failure or if you experience any of the following symptoms:  Weight gain of 3 pounds or more overnight or 5 pounds in a week, increased swelling in our hands or feet or shortness of breath while lying flat in bed. Please call your doctor as soon as you notice any of these symptoms; do not wait until your next office visit. Recognize signs and symptoms of STROKE:    F-face looks uneven    A-arms unable to move or move unevenly    S-speech slurred or non-existent    T-time-call 911 as soon as signs and symptoms begin-DO NOT go       Back to bed or wait to see if you get better-TIME IS BRAIN. Warning Signs of HEART ATTACK     Call 911 if you have these symptoms:   Chest discomfort. Most heart attacks involve discomfort in the center of the chest that lasts more than a few minutes, or that goes away and comes back. It can feel like uncomfortable pressure, squeezing, fullness, or pain.  Discomfort in other areas of the upper body. Symptoms can include pain or discomfort in one or both arms, the back, neck, jaw, or stomach.  Shortness of breath with or without chest discomfort.  Other signs may include breaking out in a cold sweat, nausea, or lightheadedness. Don't wait more than five minutes to call 911 - MINUTES MATTER! Fast action can save your life. Calling 911 is almost always the fastest way to get lifesaving treatment. Emergency Medical Services staff can begin treatment when they arrive -- up to an hour sooner than if someone gets to the hospital by car.      The discharge information has been reviewed with the patient. The patient verbalized understanding. Discharge medications reviewed with the patient and appropriate educational materials and side effects teaching were provided.   ___________________________________________________________________________________________________________________________________

## 2018-01-29 NOTE — PROGRESS NOTES
Problem: Psychosis  Goal: *STG: Remains safe in hospital  Outcome: Progressing Towards Goal  Pt remains safe in hospital on q 15 min safety checks. Calm cooperative pleasant. Medication and meal complaint. Thoughts more organizes. Goal oriented. Denies complaints. Plan: lab work today, Haldol Decanoate IM today, discharge today home with family, pt to follow up out patient.

## 2018-01-29 NOTE — BH NOTES
Calm, cooperative, smiling. Oriented and appropriate. Awake in bed, awaiting discharge. No acute distress. Q 15 min checks.

## 2018-01-29 NOTE — BH NOTES
Behavioral Health Transition Record to Provider    Patient Name: Jeana Bolden  YOB: 1965  Medical Record Number: [id number]  Date of Admission: 1/17/2018  Date of Discharge: 1/29/18    Attending Provider: Justino Palmer MD  Discharging Provider: Dr. Pamela Anton. Kim  To contact this individual call [phone] and ask the  to page. If unavailable, ask to be transferred to [facility] Provider on call. A Behavioral Health Provider will be available on call 24/7 and during holidays     Primary Care Provider: None    No Known Allergies   Reason for Admission: Patient was admitted under a temporary alf order for psychosis and reid proving to be an imminent danger to self and an inability to care for self. Admission Diagnosis: Bipolar 1 disorder ([facility] [address]    * No surgery found *    Results for orders placed or performed during the hospital encounter of 01/17/18   URINE CULTURE HOLD SAMPLE   Result Value Ref Range    Urine culture hold URINE ON HOLD IN MICROBIOLOGY DEPT FOR 3 DAYS     ETHYL ALCOHOL   Result Value Ref Range    ALCOHOL(ETHYL),SERUM <10 <10 MG/DL   CBC WITH AUTOMATED DIFF   Result Value Ref Range    WBC 8.2 3.6 - 11.0 K/uL    RBC 4.06 3.80 - 5.20 M/uL    HGB 11.3 (L) 11.5 - 16.0 g/dL    HCT 34.7 (L) 35.0 - 47.0 %    MCV 85.5 80.0 - 99.0 FL    MCH 27.8 26.0 - 34.0 PG    MCHC 32.6 30.0 - 36.5 g/dL    RDW 13.8 11.5 - 14.5 %    PLATELET 143 361 - 253 K/uL    NEUTROPHILS 72 32 - 75 %    LYMPHOCYTES 21 12 - 49 %    MONOCYTES 7 5 - 13 %    EOSINOPHILS 0 0 - 7 %    BASOPHILS 0 0 - 1 %    ABS. NEUTROPHILS 5.9 1.8 - 8.0 K/UL    ABS. LYMPHOCYTES 1.7 0.8 - 3.5 K/UL    ABS. MONOCYTES 0.6 0.0 - 1.0 K/UL    ABS. EOSINOPHILS 0.0 0.0 - 0.4 K/UL    ABS.  BASOPHILS 0.0 0.0 - 0.1 K/UL   METABOLIC PANEL, COMPREHENSIVE   Result Value Ref Range    Sodium 135 (L) 136 - 145 mmol/L    Potassium 3.6 3.5 - 5.1 mmol/L    Chloride 100 97 - 108 mmol/L    CO2 26 21 - 32 mmol/L    Anion gap 9 5 - 15 mmol/L    Glucose 96 65 - 100 mg/dL    BUN 43 (H) 6 - 20 MG/DL    Creatinine 1.33 (H) 0.55 - 1.02 MG/DL    BUN/Creatinine ratio 32 (H) 12 - 20      GFR est AA 51 (L) >60 ml/min/1.73m2    GFR est non-AA 42 (L) >60 ml/min/1.73m2    Calcium 9.1 8.5 - 10.1 MG/DL    Bilirubin, total 0.6 0.2 - 1.0 MG/DL    ALT (SGPT) 42 12 - 78 U/L    AST (SGOT) 125 (H) 15 - 37 U/L    Alk.  phosphatase 83 45 - 117 U/L    Protein, total 8.2 6.4 - 8.2 g/dL    Albumin 3.7 3.5 - 5.0 g/dL    Globulin 4.5 (H) 2.0 - 4.0 g/dL    A-G Ratio 0.8 (L) 1.1 - 2.2     LITHIUM   Result Value Ref Range    Lithium level <0.20 (L) 0.60 - 1.20 MMOL/L    Reported dose date: NOT PROVIDED      Reported dose time: NOT PROVIDED      Reported dose: NOT PROVIDED UNITS   DRUG SCREEN, URINE   Result Value Ref Range    AMPHETAMINES NEGATIVE  NEG      BARBITURATES NEGATIVE  NEG      BENZODIAZEPINES NEGATIVE  NEG      COCAINE NEGATIVE  NEG      METHADONE NEGATIVE  NEG      OPIATES NEGATIVE  NEG      PCP(PHENCYCLIDINE) NEGATIVE  NEG      THC (TH-CANNABINOL) NEGATIVE  NEG      Drug screen comment (NOTE)    URINALYSIS W/MICROSCOPIC   Result Value Ref Range    Color PINK      Appearance CLOUDY (A) CLEAR      Specific gravity 1.023 1.003 - 1.030      pH (UA) 5.0 5.0 - 8.0      Protein TRACE (A) NEG mg/dL    Glucose NEGATIVE  NEG mg/dL    Ketone TRACE (A) NEG mg/dL    Blood LARGE (A) NEG      Urobilinogen 1.0 0.2 - 1.0 EU/dL    Nitrites NEGATIVE  NEG      Leukocyte Esterase SMALL (A) NEG      WBC 5-10 0 - 4 /hpf    RBC >100 (H) 0 - 5 /hpf    Epithelial cells FEW FEW /lpf    Bacteria NEGATIVE  NEG /hpf    Hyaline cast 0-2 0 - 5 /lpf   HCG URINE, QL   Result Value Ref Range    HCG urine, QL NEGATIVE  NEG     BILIRUBIN, CONFIRM   Result Value Ref Range    Bilirubin UA, confirm NEGATIVE  NEG     TSH 3RD GENERATION   Result Value Ref Range    TSH 1.03 0.36 - 3.57 uIU/mL   METABOLIC PANEL, COMPREHENSIVE   Result Value Ref Range    Sodium 135 (L) 136 - 145 mmol/L    Potassium 4.0 3.5 - 5.1 mmol/L    Chloride 100 97 - 108 mmol/L    CO2 30 21 - 32 mmol/L    Anion gap 5 5 - 15 mmol/L    Glucose 96 65 - 100 mg/dL    BUN 21 (H) 6 - 20 MG/DL    Creatinine 0.85 0.55 - 1.02 MG/DL    BUN/Creatinine ratio 25 (H) 12 - 20      GFR est AA >60 >60 ml/min/1.73m2    GFR est non-AA >60 >60 ml/min/1.73m2    Calcium 8.9 8.5 - 10.1 MG/DL    Bilirubin, total 0.4 0.2 - 1.0 MG/DL    ALT (SGPT) 47 12 - 78 U/L    AST (SGOT) 86 (H) 15 - 37 U/L    Alk. phosphatase 86 45 - 117 U/L    Protein, total 8.1 6.4 - 8.2 g/dL    Albumin 3.5 3.5 - 5.0 g/dL    Globulin 4.6 (H) 2.0 - 4.0 g/dL    A-G Ratio 0.8 (L) 1.1 - 2.2     FSH AND LH   Result Value Ref Range    FSH 19.7 mIU/mL    Luteinizing hormone 4.7 mIU/mL   PROGESTERONE   Result Value Ref Range    Progesterone <0.1 ng/mL   ESTROGENS, FRACTIONATED   Result Value Ref Range    Estrone, Serum 56 pg/mL    Estradiol <5.0 pg/mL   LIPID PANEL   Result Value Ref Range    LIPID PROFILE          Cholesterol, total 221 (H) <200 MG/DL    Triglyceride 134 <150 MG/DL    HDL Cholesterol 43 MG/DL    LDL, calculated 151.2 (H) 0 - 100 MG/DL    VLDL, calculated 26.8 MG/DL    CHOL/HDL Ratio 5.1 (H) 0 - 5.0     METABOLIC PANEL, COMPREHENSIVE   Result Value Ref Range    Sodium 134 (L) 136 - 145 mmol/L    Potassium 4.0 3.5 - 5.1 mmol/L    Chloride 101 97 - 108 mmol/L    CO2 26 21 - 32 mmol/L    Anion gap 7 5 - 15 mmol/L    Glucose 102 (H) 65 - 100 mg/dL    BUN 17 6 - 20 MG/DL    Creatinine 0.99 0.55 - 1.02 MG/DL    BUN/Creatinine ratio 17 12 - 20      GFR est AA >60 >60 ml/min/1.73m2    GFR est non-AA 59 (L) >60 ml/min/1.73m2    Calcium 9.8 8.5 - 10.1 MG/DL    Bilirubin, total 0.4 0.2 - 1.0 MG/DL    ALT (SGPT) 29 12 - 78 U/L    AST (SGOT) 14 (L) 15 - 37 U/L    Alk.  phosphatase 80 45 - 117 U/L    Protein, total 8.2 6.4 - 8.2 g/dL    Albumin 3.5 3.5 - 5.0 g/dL    Globulin 4.7 (H) 2.0 - 4.0 g/dL    A-G Ratio 0.7 (L) 1.1 - 2.2     LITHIUM   Result Value Ref Range    Lithium level 0.83 0.60 - 1.20 MMOL/L Reported dose date: NOT PROVIDED      Reported dose time: NOT PROVIDED      Reported dose: NOT PROVIDED UNITS   CBC W/O DIFF   Result Value Ref Range    WBC 7.1 3.6 - 11.0 K/uL    RBC 4.16 3.80 - 5.20 M/uL    HGB 11.8 11.5 - 16.0 g/dL    HCT 36.6 35.0 - 47.0 %    MCV 88.0 80.0 - 99.0 FL    MCH 28.4 26.0 - 34.0 PG    MCHC 32.2 30.0 - 36.5 g/dL    RDW 13.2 11.5 - 14.5 %    PLATELET 638 878 - 480 K/uL    MPV 9.5 8.9 - 12.9 FL    NRBC 0.0 0  WBC    ABSOLUTE NRBC 0.00 0.00 - 8.47 K/uL   METABOLIC PANEL, COMPREHENSIVE   Result Value Ref Range    Sodium 136 136 - 145 mmol/L    Potassium 4.5 3.5 - 5.1 mmol/L    Chloride 100 97 - 108 mmol/L    CO2 31 21 - 32 mmol/L    Anion gap 5 5 - 15 mmol/L    Glucose 96 65 - 100 mg/dL    BUN 15 6 - 20 MG/DL    Creatinine 1.10 (H) 0.55 - 1.02 MG/DL    BUN/Creatinine ratio 14 12 - 20      GFR est AA >60 >60 ml/min/1.73m2    GFR est non-AA 52 (L) >60 ml/min/1.73m2    Calcium 9.9 8.5 - 10.1 MG/DL    Bilirubin, total 0.2 0.2 - 1.0 MG/DL    ALT (SGPT) 20 12 - 78 U/L    AST (SGOT) 11 (L) 15 - 37 U/L    Alk. phosphatase 80 45 - 117 U/L    Protein, total 7.9 6.4 - 8.2 g/dL    Albumin 3.6 3.5 - 5.0 g/dL    Globulin 4.3 (H) 2.0 - 4.0 g/dL    A-G Ratio 0.8 (L) 1.1 - 2.2     VALPROIC ACID   Result Value Ref Range    Valproic acid 25 (L) 50 - 100 ug/ml   LITHIUM   Result Value Ref Range    Lithium level 1.18 0.60 - 1.20 MMOL/L    Reported dose date: NOT PROVIDED      Reported dose time: NOT PROVIDED      Reported dose: NOT PROVIDED UNITS       Immunizations administered during this encounter: There is no immunization history on file for this patient. Screening for Metabolic Disorders for Patients on Antipsychotic Medications  (Data obtained from the EMR)    Estimated Body Mass Index  Estimated body mass index is 26.38 kg/(m^2) as calculated from the following:    Height as of 1/15/18: 6' (1.829 m). Weight as of this encounter: 88.2 kg (194 lb 8 oz).      Vital Signs/Blood Pressure  Visit Vitals    /73    Pulse 81    Temp 98.1 °F (36.7 °C)    Resp 16    Wt 88.2 kg (194 lb 8 oz)    SpO2 99%    Breastfeeding No    BMI 26.38 kg/m2       Hemoglobin A1c  No results found for: HBA1C, HGBE8, RZG6ODMC     Lipid Panel  Lab Results   Component Value Date/Time    Cholesterol, total 221 01/23/2018 05:58 AM    HDL Cholesterol 43 01/23/2018 05:58 AM    LDL, calculated 151.2 01/23/2018 05:58 AM    Triglyceride 134 01/23/2018 05:58 AM    CHOL/HDL Ratio 5.1 01/23/2018 05:58 AM       Discharge Diagnosis: Bipolar I Disorder    Discharge Plan: What to do at Home:  Recommended activity: Activity as tolerated.      If you experience any of the following symptoms hopeless, helpless, overwhelming thoughts of harming self or others, uncontrolled auditory or visual hallucinations, please call  [facility] or your local [facility] health crisis line number at [phone] [facility].     *  Please give a list of your current medications to your Primary Care Provider.     *  Please update this list whenever your medications are discontinued, doses are      changed, or new medications (including over-the-counter products) are added.     *  Please carry medication information at all times in case of emergency situations.       Discharge Medication List and Instructions:   Current Discharge Medication List      START taking these medications    Details   lithium carbonate CR (ESKALITH CR) 450 mg CR tablet Take 2 Tabs by mouth nightly. Indications: Bipolar Disorder  Qty: 60 Tab, Refills: 0      haloperidol decanoate (HALDOL DECANOATE) 100 mg/mL injection 1 mL by IntraMUSCular route every twenty-eight (28) days. Indications: Schizophrenia  Qty: 1 Vial, Refills: 0      haloperidol (HALDOL) 10 mg tablet Take 1 Tab by mouth nightly. Indications: Psychotic Disorder  Qty: 30 Tab, Refills: 0      divalproex ER (DEPAKOTE ER) 500 mg ER tablet Take 2 Tabs by mouth nightly.  Indications: MIXED BIPOLAR I DISORDER  Qty: 60 Tab, Refills: 0         CONTINUE these medications which have CHANGED    Details   lisinopril-hydroCHLOROthiazide (PRINZIDE, ZESTORETIC) 20-25 mg per tablet Take 1 Tab by mouth daily. Indications: hypertension  Qty: 30 Tab, Refills: 0         CONTINUE these medications which have NOT CHANGED    Details   fish oil-omega-3 fatty acids 340-1,000 mg capsule Take 1 Cap by mouth daily. therapeutic multivitamin (THERAGRAN) tablet Take 1 Tab by mouth daily. Unresulted Labs (24h ago through future)    Start     Ordered    01/29/18 1045  LITHIUM  ONE TIME,   R      01/29/18 1044    01/29/18 1045  VALPROIC ACID  ONE TIME,   R      01/29/18 1044    01/29/18 1045  CBC W/O DIFF  ONE TIME,   R      01/29/18 1044    23/43/50 4159  METABOLIC PANEL, COMPREHENSIVE  ONE TIME,   R      01/29/18 1044        To obtain results of studies pending at discharge, please contact [phone]    Follow-up Information     Follow up With Details Comments Contact Info    Haloperidol Deconaote BHATIA  On 2/26/2018  Haloperidol 100mg IM every 28 days    None   None (395) Patient stated that they have no PCP      [address] On 1/30/2018 Tuesday @ 9am. Bring identification and rental leso [place] Location  [facility] [address]  [place], [address] [place] [address]  [phone]          Advanced Directive:   Does the patient have an appointed surrogate decision maker? No  Does the patient have a Medical Advance Directive? No  Does the patient have a Psychiatric Advance Directive? No  If the patient does not have a surrogate or Medical Advance Directive AND Psychiatric Advance Directive, the patient was offered information on these advance directives Patient declined to complete      Patient Instructions: Please continue all medications until otherwise directed by physician. Tobacco Cessation Discharge Plan:   Is the patient a smoker and needs referral for smoking cessation?  No  Patient referred to the following for smoking cessation with an appointment? Not applicable     Patient was offered medication to assist with smoking cessation at discharge? Not applicable  Was education for smoking cessation added to the discharge instructions? Not applicable    Alcohol/Substance Abuse Discharge Plan:   Does the patient have a history of substance/alcohol abuse and requires a referral for treatment? No  Patient referred to the following for substance/alcohol abuse treatment with an appointment? No  Patient was offered medication to assist with alcohol cessation at discharge? Not applicable  Was education for substance/alcohol abuse added to discharge instructions?  Not applicable    Patient discharged to Home; discussed with patient/caregiver

## 2018-01-29 NOTE — PROGRESS NOTES
PRN Medication Documentation    Specific patient behavior that led to need for PRN medication: pt c/o 3/10 aching pain to back onset now  Staff interventions attempted prior to PRN being given: education, ambulation, rest   PRN medication given: po 650 mg Tylenol   Patient response/effectiveness of PRN medication: pt visible on unit

## 2018-01-29 NOTE — BH NOTES
GROUP THERAPY PROGRESS NOTE    Rosa Lopez participated in the Acute Unit's afternoon Process Group, with a focus on identifying feelings, planning for the rest of the day, and preparing for discharge. Group time: 35 minutes. Personal goal for participation: To increase the capacity to improve ones mood and structure. Goal orientation: The patient will be able to identify their feelings, develop a plan for structuring their day, and discharge planning. Group therapy participation: With prompting, this patient participated in the group. Therapeutic interventions reviewed and discussed: The group members were asked to introduce themselves to each other and to see if they could identify an emotion they are having and/or let the group know what they want to focus on for the day as they continue to make discharge plans. Impression of participation: The patient said she was \"feeling fine. \" She added that she was pleased that she was being discharged later today. She was casually dressed and well-groomed. She smiled as she talked about leaving the hospital. She said she knew how important it would be for her to take her medication as prescribed and to keep her appointments with her prescriber. She expressed no current SI/HI and displayed no overt psychotic symptoms in this group. She added that she would be relying also on her jose to keep her emotionally steady and that she hopes to find a part-time job to help support her. Her affect was mildly euphoric and her mood matched her affect. She will need to be followed closely by her local [facility], given the history of her medication non-compliance.

## 2018-01-29 NOTE — DISCHARGE SUMMARY
PSYCHIATRIC DISCHARGE SUMMARY         IDENTIFICATION:    Patient Name  Rosa Lopez   Date of Birth 1965   [facility] [id number]   Medical Record Number  [id number]      Age  48 y.o. PCP None   Admit date:  1/17/2018    Discharge date: 1/29/2018   Room Number  734/01  @ [facility]   Date of Service  1/29/2018               TYPE OF DISCHARGE: REGULAR               CONDITION AT DISCHARGE: good       PROVISIONAL & DISCHARGE DIAGNOSES:    Problem List  Never Reviewed          Codes Class    * (Principal)Bipolar 1 disorder ([facility] [address] ICD-10-CM: F31.9  ICD-9-CM: 296.7               Active Hospital Problems    *Bipolar 1 disorder ([facility] [address]        DISCHARGE DIAGNOSIS:   Axis I:  SEE ABOVE  Axis II: SEE ABOVE  Axis III: SEE ABOVE  Axis IV:  lack of structure  Axis V:  50 on admission, 70 on discharge 70(baseline)       CC & HISTORY OF PRESENT ILLNESS:48year old AA female admitted on TDO for bipolar D/O with psychotic features. Pt responded well to lithium , depakote and haldol. She agreed to follow up at her [facility] and take Haldol dec. At discharge she denied SI/HI intent and plan, and did not meet TDO criteria . SOCIAL HISTORY:    Social History     Social History    Marital status: SINGLE     Spouse name: N/A    Number of children: N/A    Years of education: N/A     Occupational History    Not on file. Social History Main Topics    Smoking status: Heavy Tobacco Smoker    Smokeless tobacco: Former User    Alcohol use No    Drug use: No    Sexual activity: Not on file     Other Topics Concern    Not on file     Social History Narrative    50year old AA female admitted on TDO for walking out of her paid apt. And stating she wants to be homeless because \"people have been following me and tazing me. \" Pt has severe financial problems and this has been a huge stressor. She is not on disability but is inn subsidized housing.  [facility] but has not always complied due to transportation problem. FAMILY HISTORY:   No family history on file. HOSPITALIZATION COURSE:    Regine Nguyen was admitted to the inpatient psychiatric unit [facility] for acute psychiatric stabilization in regards to symptomatology as described in the HPI above. The differential diagnosis at time of admission included: bipolar dis vs. schizoaffective vs. Schizophrenia. While on the unit Regine Nguyen was involved in individual, group, occupational and milieu therapy. Psychiatric medications were adjusted during this hospitalization including lithium. Regine Nguyen demonstrated a slow, but progressive improvement in overall condition. Much of patient's depression appeared to be related to situational stressors and psychological factors. Please see individual progress notes for more specific details regarding patient's hospitalization course. At time of dc, Regine Nguyen was without significant problems with depression psychosis  reid. Overall presentation at time of discharge is most consistent with the diagnosis of bipolar disorder  with depressed mood. Patient with request for discharge today. There are no grounds to seek a TDO. Patient has maximized benefit to be derived from acute inpatient psychiatric treatment.   All members of the treatment team concur with each other in regards to plans for discharge today per patient's request.          LABS AND IMAGAING:    Labs Reviewed   CBC WITH AUTOMATED DIFF - Abnormal; Notable for the following:        Result Value    HGB 11.3 (*)     HCT 34.7 (*)     All other components within normal limits   METABOLIC PANEL, COMPREHENSIVE - Abnormal; Notable for the following:     Sodium 135 (*)     BUN 43 (*)     Creatinine 1.33 (*)     BUN/Creatinine ratio 32 (*)     GFR est AA 51 (*)     GFR est non-AA 42 (*)     AST (SGOT) 125 (*)     Globulin 4.5 (*)     A-G Ratio 0.8 (*)     All other components within normal limits   LITHIUM - Abnormal; Notable for the following:     Lithium level <0.20 (*)     All other components within normal limits   URINALYSIS W/MICROSCOPIC - Abnormal; Notable for the following:     Appearance CLOUDY (*)     Protein TRACE (*)     Ketone TRACE (*)     Blood LARGE (*)     Leukocyte Esterase SMALL (*)     RBC >100 (*)     All other components within normal limits   METABOLIC PANEL, COMPREHENSIVE - Abnormal; Notable for the following:     Sodium 135 (*)     BUN 21 (*)     BUN/Creatinine ratio 25 (*)     AST (SGOT) 86 (*)     Globulin 4.6 (*)     A-G Ratio 0.8 (*)     All other components within normal limits   LIPID PANEL - Abnormal; Notable for the following:     Cholesterol, total 221 (*)     LDL, calculated 151.2 (*)     CHOL/HDL Ratio 5.1 (*)     All other components within normal limits   METABOLIC PANEL, COMPREHENSIVE - Abnormal; Notable for the following:     Sodium 134 (*)     Glucose 102 (*)     GFR est non-AA 59 (*)     AST (SGOT) 14 (*)     Globulin 4.7 (*)     A-G Ratio 0.7 (*)     All other components within normal limits   URINE CULTURE HOLD SAMPLE   ETHYL ALCOHOL   DRUG SCREEN, URINE   HCG URINE, QL   BILIRUBIN, CONFIRM   TSH 3RD GENERATION   FSH AND LH   PROGESTERONE   ESTROGENS, FRACTIONATED   LITHIUM   CBC W/O DIFF   METABOLIC PANEL, COMPREHENSIVE   VALPROIC ACID   LITHIUM   LITHIUM   VALPROIC ACID   CBC W/O DIFF   METABOLIC PANEL, COMPREHENSIVE     Admission on 01/17/2018   Component Date Value Ref Range Status    ALCOHOL(ETHYL),SERUM 01/17/2018 <10  <10 MG/DL Final    WBC 01/17/2018 8.2  3.6 - 11.0 K/uL Final    RBC 01/17/2018 4.06  3.80 - 5.20 M/uL Final    HGB 01/17/2018 11.3* 11.5 - 16.0 g/dL Final    HCT 01/17/2018 34.7* 35.0 - 47.0 % Final    MCV 01/17/2018 85.5  80.0 - 99.0 FL Final    MCH 01/17/2018 27.8  26.0 - 34.0 PG Final    MCHC 01/17/2018 32.6  30.0 - 36.5 g/dL Final    RDW 01/17/2018 13.8  11.5 - 14.5 % Final    PLATELET 85/11/6923 017  150 - 400 K/uL Final    NEUTROPHILS 01/17/2018 72 32 - 75 % Final    LYMPHOCYTES 01/17/2018 21  12 - 49 % Final    MONOCYTES 01/17/2018 7  5 - 13 % Final    EOSINOPHILS 01/17/2018 0  0 - 7 % Final    BASOPHILS 01/17/2018 0  0 - 1 % Final    ABS. NEUTROPHILS 01/17/2018 5.9  1.8 - 8.0 K/UL Final    ABS. LYMPHOCYTES 01/17/2018 1.7  0.8 - 3.5 K/UL Final    ABS. MONOCYTES 01/17/2018 0.6  0.0 - 1.0 K/UL Final    ABS. EOSINOPHILS 01/17/2018 0.0  0.0 - 0.4 K/UL Final    ABS. BASOPHILS 01/17/2018 0.0  0.0 - 0.1 K/UL Final    Sodium 01/17/2018 135* 136 - 145 mmol/L Final    Potassium 01/17/2018 3.6  3.5 - 5.1 mmol/L Final    Chloride 01/17/2018 100  97 - 108 mmol/L Final    CO2 01/17/2018 26  21 - 32 mmol/L Final    Anion gap 01/17/2018 9  5 - 15 mmol/L Final    Glucose 01/17/2018 96  65 - 100 mg/dL Final    BUN 01/17/2018 43* 6 - 20 MG/DL Final    Creatinine 01/17/2018 1.33* 0.55 - 1.02 MG/DL Final    BUN/Creatinine ratio 01/17/2018 32* 12 - 20   Final    GFR est AA 01/17/2018 51* >60 ml/min/1.73m2 Final    GFR est non-AA 01/17/2018 42* >60 ml/min/1.73m2 Final    Calcium 01/17/2018 9.1  8.5 - 10.1 MG/DL Final    Bilirubin, total 01/17/2018 0.6  0.2 - 1.0 MG/DL Final    ALT (SGPT) 01/17/2018 42  12 - 78 U/L Final    AST (SGOT) 01/17/2018 125* 15 - 37 U/L Final    Alk.  phosphatase 01/17/2018 83  45 - 117 U/L Final    Protein, total 01/17/2018 8.2  6.4 - 8.2 g/dL Final    Albumin 01/17/2018 3.7  3.5 - 5.0 g/dL Final    Globulin 01/17/2018 4.5* 2.0 - 4.0 g/dL Final    A-G Ratio 01/17/2018 0.8* 1.1 - 2.2   Final    Lithium level 01/17/2018 <0.20* 0.60 - 1.20 MMOL/L Final    Reported dose date: 01/17/2018 NOT PROVIDED    Final    Reported dose time: 01/17/2018 NOT PROVIDED    Final    Reported dose: 01/17/2018 NOT PROVIDED  UNITS Final    AMPHETAMINES 01/17/2018 NEGATIVE   NEG   Final    BARBITURATES 01/17/2018 NEGATIVE   NEG   Final    BENZODIAZEPINES 01/17/2018 NEGATIVE   NEG   Final    COCAINE 01/17/2018 NEGATIVE   NEG   Final    METHADONE 01/17/2018 NEGATIVE   NEG   Final    OPIATES 01/17/2018 NEGATIVE   NEG   Final    PCP(PHENCYCLIDINE) 01/17/2018 NEGATIVE   NEG   Final    THC (TH-CANNABINOL) 01/17/2018 NEGATIVE   NEG   Final    Drug screen comment 01/17/2018 (NOTE)   Final    Color 01/17/2018 PINK    Final    Appearance 01/17/2018 CLOUDY* CLEAR   Final    Specific gravity 01/17/2018 1.023  1.003 - 1.030   Final    pH (UA) 01/17/2018 5.0  5.0 - 8.0   Final    Protein 01/17/2018 TRACE* NEG mg/dL Final    Glucose 01/17/2018 NEGATIVE   NEG mg/dL Final    Ketone 01/17/2018 TRACE* NEG mg/dL Final    Blood 01/17/2018 LARGE* NEG   Final    Urobilinogen 01/17/2018 1.0  0.2 - 1.0 EU/dL Final    Nitrites 01/17/2018 NEGATIVE   NEG   Final    Leukocyte Esterase 01/17/2018 SMALL* NEG   Final    WBC 01/17/2018 5-10  0 - 4 /hpf Final    RBC 01/17/2018 >100* 0 - 5 /hpf Final    Epithelial cells 01/17/2018 FEW  FEW /lpf Final    Bacteria 01/17/2018 NEGATIVE   NEG /hpf Final    Hyaline cast 01/17/2018 0-2  0 - 5 /lpf Final    Urine culture hold 01/17/2018 URINE ON HOLD IN MICROBIOLOGY DEPT FOR 3 DAYS    Final    HCG urine, QL 01/17/2018 NEGATIVE   NEG   Final    Bilirubin UA, confirm 01/17/2018 NEGATIVE   NEG   Final    TSH 01/17/2018 1.03  0.36 - 3.74 uIU/mL Final    Sodium 01/19/2018 135* 136 - 145 mmol/L Final    Potassium 01/19/2018 4.0  3.5 - 5.1 mmol/L Final    Chloride 01/19/2018 100  97 - 108 mmol/L Final    CO2 01/19/2018 30  21 - 32 mmol/L Final    Anion gap 01/19/2018 5  5 - 15 mmol/L Final    Glucose 01/19/2018 96  65 - 100 mg/dL Final    BUN 01/19/2018 21* 6 - 20 MG/DL Final    Creatinine 01/19/2018 0.85  0.55 - 1.02 MG/DL Final    BUN/Creatinine ratio 01/19/2018 25* 12 - 20   Final    GFR est AA 01/19/2018 >60  >60 ml/min/1.73m2 Final    GFR est non-AA 01/19/2018 >60  >60 ml/min/1.73m2 Final    Calcium 01/19/2018 8.9  8.5 - 10.1 MG/DL Final    Bilirubin, total 01/19/2018 0.4  0.2 - 1.0 MG/DL Final    ALT (SGPT) 01/19/2018 47  12 - 78 U/L Final    AST (SGOT) 01/19/2018 86* 15 - 37 U/L Final    Alk. phosphatase 01/19/2018 86  45 - 117 U/L Final    Protein, total 01/19/2018 8.1  6.4 - 8.2 g/dL Final    Albumin 01/19/2018 3.5  3.5 - 5.0 g/dL Final    Globulin 01/19/2018 4.6* 2.0 - 4.0 g/dL Final    A-G Ratio 01/19/2018 0.8* 1.1 - 2.2   Final    [facility] 01/22/2018 19.7  mIU/mL Final    Luteinizing hormone 01/22/2018 4.7  mIU/mL Final    Progesterone 01/22/2018 <0.1  ng/mL Final    Estrone, Serum 01/22/2018 56  pg/mL Final    Estradiol 01/22/2018 <5.0  pg/mL Final    LIPID PROFILE 01/23/2018        Final    Cholesterol, total 01/23/2018 221* <200 MG/DL Final    Triglyceride 01/23/2018 134  <150 MG/DL Final    HDL Cholesterol 01/23/2018 43  MG/DL Final    LDL, calculated 01/23/2018 151.2* 0 - 100 MG/DL Final    VLDL, calculated 01/23/2018 26.8  MG/DL Final    CHOL/HDL Ratio 01/23/2018 5.1* 0 - 5.0   Final    Sodium 01/23/2018 134* 136 - 145 mmol/L Final    Potassium 01/23/2018 4.0  3.5 - 5.1 mmol/L Final    Chloride 01/23/2018 101  97 - 108 mmol/L Final    CO2 01/23/2018 26  21 - 32 mmol/L Final    Anion gap 01/23/2018 7  5 - 15 mmol/L Final    Glucose 01/23/2018 102* 65 - 100 mg/dL Final    BUN 01/23/2018 17  6 - 20 MG/DL Final    Creatinine 01/23/2018 0.99  0.55 - 1.02 MG/DL Final    BUN/Creatinine ratio 01/23/2018 17  12 - 20   Final    GFR est AA 01/23/2018 >60  >60 ml/min/1.73m2 Final    GFR est non-AA 01/23/2018 59* >60 ml/min/1.73m2 Final    Calcium 01/23/2018 9.8  8.5 - 10.1 MG/DL Final    Bilirubin, total 01/23/2018 0.4  0.2 - 1.0 MG/DL Final    ALT (SGPT) 01/23/2018 29  12 - 78 U/L Final    AST (SGOT) 01/23/2018 14* 15 - 37 U/L Final    Alk.  phosphatase 01/23/2018 80  45 - 117 U/L Final    Protein, total 01/23/2018 8.2  6.4 - 8.2 g/dL Final    Albumin 01/23/2018 3.5  3.5 - 5.0 g/dL Final    Globulin 01/23/2018 4.7* 2.0 - 4.0 g/dL Final    A-G Ratio 01/23/2018 0.7* 1.1 - 2.2   Final    Lithium level 01/23/2018 0.83  0.60 - 1.20 MMOL/L Final    Reported dose date: 01/23/2018 NOT PROVIDED    Final    Reported dose time: 01/23/2018 NOT PROVIDED    Final    Reported dose: 01/23/2018 NOT PROVIDED  UNITS Final     No results found. DISPOSITION:    Home. Patient to f/u with o/p psychiatric, and psychotherapy appointments. Patient is to f/u with internist as directed. Patient should have a depakote level and associated labs checked within the next 1-2 weeks by patient's o/p psychiatrist/internist.               FOLLOW-UP CARE:    Activity as tolerated  Regular Diet  Wound Care: none needed. Follow-up Information     Follow up With Details Comments Contact Info    Haloperidol Deconaote BHATIA  On 2/26/2018  Haloperidol 100mg IM every 28 days    None   None (395) Patient stated that they have no PCP                   PROGNOSIS:  Greatly dependent upon patient's ability to f/u with o/p psychiatric/psychotherapy appointments as well as to comply with psychiatric medications as prescribed. Patient denies suicidal or homicidal ideations. Dorothy Larsen fully contracts for safety. Patient reports many positive predictive factors in terms of not attempting suicide or homicide. Patient appears to be at low risk of suicide or homicide. Patient and family are aware and in agreement with discharge and discharge plan. DISCHARGE MEDICATIONS: (no changes made). Informed consent given for the use of following psychotropic medications:  Current Discharge Medication List      START taking these medications    Details   lithium carbonate CR (ESKALITH CR) 450 mg CR tablet Take 2 Tabs by mouth nightly. Indications: Bipolar Disorder  Qty: 60 Tab, Refills: 0      hydroCHLOROthiazide (HYDRODIURIL) 25 mg tablet Take 1 Tab by mouth daily.  Indications: hypertension  Qty: 30 Tab, Refills: 0      haloperidol decanoate (HALDOL DECANOATE) 100 mg/mL injection 1 mL by IntraMUSCular route every twenty-eight (28) days. Indications: Schizophrenia  Qty: 1 Vial, Refills: 0      haloperidol (HALDOL) 10 mg tablet Take 1 Tab by mouth nightly. Indications: Psychotic Disorder  Qty: 30 Tab, Refills: 0      divalproex ER (DEPAKOTE ER) 500 mg ER tablet Take 2 Tabs by mouth nightly. Indications: MIXED BIPOLAR I DISORDER  Qty: 60 Tab, Refills: 0         CONTINUE these medications which have CHANGED    Details   lisinopril-hydroCHLOROthiazide (PRINZIDE, ZESTORETIC) 20-25 mg per tablet Take 1 Tab by mouth daily. Indications: hypertension  Qty: 30 Tab, Refills: 0         CONTINUE these medications which have NOT CHANGED    Details   fish oil-omega-3 fatty acids 340-1,000 mg capsule Take 1 Cap by mouth daily. therapeutic multivitamin (THERAGRAN) tablet Take 1 Tab by mouth daily. A coordinated, multidisplinary treatment team round was conducted with [employer] is done daily here at [facility] . This team consists of the nurse, psychiatric unit pharmcist,  and writer. I have spent greater than 35 minutes on discharge work.     Signed:  Starla Velásquez MD  1/29/2018

## 2018-01-29 NOTE — BH NOTES
GROUP THERAPY PROGRESS NOTE    Rosa Lopez is participating in Le[employer] Group. Group time: 30 minutes    Personal goal for participation: relaxation    Goal orientation: personal    Group therapy participation: passive    Therapeutic interventions reviewed and discussed: Music on [employer], pictures to color an puzzles    Impression of participation: \"You should get more security. \" Patient in fair spirits but appears anxious at times focusing on certain peers. Remains religiously preoccupied.

## 2018-01-29 NOTE — BH NOTES
1750  pt discharged instructions were reviewed and belongings were returned. Staff escorted pt off the the unit to meet son.

## 2018-01-29 NOTE — INTERDISCIPLINARY ROUNDS
Behavioral Health Interdisciplinary Rounds     Patient Name: Brigid Alanis  Age: 48 y.o.   Room/Bed:  734/  Primary Diagnosis: Bipolar 1 disorder (HCC)   Admission Status: Voluntary     Readmission within 30 days: no  Power of  in place: no  Patient requires a blocked bed: no          Reason for blocked bed: n/a    VTE Prophylaxis: Not indicated  Flu vaccine given : yes - PTA  Mobility needs/Fall risk: yes    Nutritional Plan: no  Consults:          Labs/Testing due today?: no    Sleep hours: 8.25       Participation in Care/Groups:  yes  Medication Compliant?: Yes  PRNS (last 24 hours): Pain    Restraints (last 24 hours):  no  Substance Abuse:  no  CIWA (range last 24 hours):  COWS (range last 24 hours):   Alcohol screening (AUDIT) completed -  AUDIT Score: 0  If applicable, date SBIRT discussed in treatment team AND documented:   Tobacco - patient is a smoker:    Date tobacco education completed by RN: n/a  24 hour chart check complete: yes     Patient goal(s) for today: ready for discharge  Treatment team focus/goals:  Patient is stable and at baseline  Progress note     LOS:  12  Expected LOS: Discharged    Financial concerns/prescription coverage:  Medication provided  Date of last family contact:   1/29/18    Family requesting physician contact today:  No  Discharge plan: Return to home  Guns in the home:  No       Outpatient provider(s): [facility]    Participating treatment team members: Dr. Aundrea Santiago; Claudean Stai, PharmD; Renee Beltran

## 2018-01-29 NOTE — PROGRESS NOTES
Pharmacist Discharge Medication Reconciliation    Discharging Provider: Dr. Ekaterina Crowell PMH:   Past Medical History:   Diagnosis Date    Arthritis     Bilateral knees    Hallucination     Paranoia ([address]     Psychiatric disorder     anxiety,bipolar     Chief Complaint for this Admission:   Chief Complaint   Patient presents with    Mental Health Problem     Allergies: Review of patient's allergies indicates no known allergies. Discharge Medications:   Current Discharge Medication List        START taking these medications    Details   lithium carbonate CR (ESKALITH CR) 450 mg CR tablet Take 2 Tabs by mouth nightly. Indications: Bipolar Disorder  Qty: 60 Tab, Refills: 0      haloperidol decanoate (HALDOL DECANOATE) 100 mg/mL injection 1 mL by IntraMUSCular route every twenty-eight (28) days. Indications: Schizophrenia  Qty: 1 Vial, Refills: 0      haloperidol (HALDOL) 10 mg tablet Take 1 Tab by mouth nightly. Indications: Psychotic Disorder  Qty: 30 Tab, Refills: 0      divalproex ER (DEPAKOTE ER) 500 mg ER tablet Take 2 Tabs by mouth nightly. Indications: MIXED BIPOLAR I DISORDER  Qty: 60 Tab, Refills: 0           CONTINUE these medications which have CHANGED    Details   lisinopril-hydroCHLOROthiazide (PRINZIDE, ZESTORETIC) 20-25 mg per tablet Take 1 Tab by mouth daily. Indications: hypertension  Qty: 30 Tab, Refills: 0           CONTINUE these medications which have NOT CHANGED    Details   fish oil-omega-3 fatty acids 340-1,000 mg capsule Take 1 Cap by mouth daily. therapeutic multivitamin (THERAGRAN) tablet Take 1 Tab by mouth daily.              The patient's chart, MAR and AVS were reviewed by Kwaku Lee PHARMD.

## 2022-02-08 ENCOUNTER — TELEPHONE (OUTPATIENT)
Dept: SLEEP MEDICINE | Age: 57
End: 2022-02-08

## 2022-02-08 NOTE — TELEPHONE ENCOUNTER
Received request from Serjio Reyes at [facility] to call her and schedule appt for patient [phone]. LM to schedule. Scanned in media.

## 2022-03-19 PROBLEM — F31.9 BIPOLAR 1 DISORDER (HCC): Status: ACTIVE | Noted: 2018-01-17

## 2023-01-06 ENCOUNTER — OFFICE VISIT (OUTPATIENT)
Dept: CARDIOLOGY CLINIC | Age: 58
End: 2023-01-06
Payer: MEDICAID

## 2023-01-06 VITALS
HEIGHT: 72 IN | BODY MASS INDEX: 33.18 KG/M2 | HEART RATE: 90 BPM | OXYGEN SATURATION: 100 % | WEIGHT: 245 LBS | SYSTOLIC BLOOD PRESSURE: 130 MMHG | RESPIRATION RATE: 16 BRPM | DIASTOLIC BLOOD PRESSURE: 80 MMHG

## 2023-01-06 DIAGNOSIS — R60.9 EDEMA, UNSPECIFIED TYPE: ICD-10-CM

## 2023-01-06 DIAGNOSIS — F31.9 BIPOLAR 1 DISORDER (HCC): ICD-10-CM

## 2023-01-06 DIAGNOSIS — I10 HYPERTENSION, UNSPECIFIED TYPE: Primary | ICD-10-CM

## 2023-01-06 RX ORDER — DOCUSATE SODIUM 100 MG/1
CAPSULE, LIQUID FILLED ORAL
COMMUNITY
Start: 2022-12-30

## 2023-01-06 RX ORDER — HYDROCHLOROTHIAZIDE 25 MG/1
25 TABLET ORAL DAILY
COMMUNITY
Start: 2022-12-14

## 2023-01-06 RX ORDER — TRAZODONE HYDROCHLORIDE 150 MG/1
TABLET ORAL
COMMUNITY
Start: 2022-12-30

## 2023-01-06 RX ORDER — AMLODIPINE BESYLATE 10 MG/1
10 TABLET ORAL DAILY
COMMUNITY
Start: 2022-12-14

## 2023-01-06 RX ORDER — LISINOPRIL 30 MG/1
TABLET ORAL
COMMUNITY
End: 2023-01-06

## 2023-01-06 RX ORDER — ATORVASTATIN CALCIUM 40 MG/1
40 TABLET, FILM COATED ORAL DAILY
COMMUNITY
Start: 2022-12-14

## 2023-01-06 NOTE — PROGRESS NOTES
Jaspal Donovan MD, MS, [facility]            HISTORY OF PRESENT ILLNESS:    Kirsten Cruz is a 56 y.o. female referred for HTN. Bipolar. HTN on lisinopril HCT, amlodipine 10, increased last Fall. Has developed LE edema for which she presnets today. NO exertional symptoms. No chest pain. EKG reviewed. Labs reviewed. Discussed LE edema likely venous stasis possibly exacerbation by higher dose amlodipine 10mg. I suggested switching her medications - decreasing amlodipine to 5, change lisinopril HCT to valsartan HCTZ - she is hesitant to do this today, would like o ttalk to her PCP. SUMMARY:   Problem List  Date Reviewed: 1/6/2023            Codes Class Noted    Hypertension ICD-10-CM: I10  ICD-9-CM: 401.9  1/8/2023        Edema ICD-10-CM: R60.9  ICD-9-CM: 782.3  1/8/2023        Bipolar 1 disorder ([facility] ICD-10-CM: F31.9  ICD-9-CM: 296.7  1/17/2018           Current Outpatient Medications on File Prior to Visit   Medication Sig    traZODone (DESYREL) 150 mg tablet     hydroCHLOROthiazide (HYDRODIURIL) 25 mg tablet Take 25 mg by mouth daily. atorvastatin (LIPITOR) 40 mg tablet Take 40 mg by mouth daily. amLODIPine (NORVASC) 10 mg tablet Take 10 mg by mouth daily. docusate sodium (COLACE) 100 mg capsule     lithium carbonate CR (ESKALITH CR) 450 mg CR tablet Take 2 Tabs by mouth nightly. Indications: Bipolar Disorder    haloperidol decanoate (HALDOL DECANOATE) 100 mg/mL injection 1 mL by IntraMUSCular route every twenty-eight (28) days. Indications: Schizophrenia    haloperidol (HALDOL) 10 mg tablet Take 1 Tab by mouth nightly. Indications: Psychotic Disorder    divalproex ER (DEPAKOTE ER) 500 mg ER tablet Take 2 Tabs by mouth nightly. Indications: MIXED BIPOLAR I DISORDER    fish oil-omega-3 fatty acids 340-1,000 mg capsule Take 1 Cap by mouth daily. therapeutic multivitamin (THERAGRAN) tablet Take 1 Tab by mouth daily.     lisinopril-hydroCHLOROthiazide (Francisco María) 20-25 mg per tablet Take 1 Tab by mouth daily. Indications: hypertension     No current facility-administered medications on file prior to visit. CARDIOLOGY STUDIES TO DATE:  No results found for this visit on 01/06/23. CARDIAC ROS:   negative    Past Medical History:   Diagnosis Date    Arthritis     Bilateral knees    Hallucination     Paranoia ([address]     Psychiatric disorder     anxiety,bipolar       History reviewed. No pertinent family history. Social History     Socioeconomic History    Marital status: SINGLE     Spouse name: Not on file    Number of children: Not on file    Years of education: Not on file    Highest education level: Not on file   Occupational History    Not on file   Tobacco Use    Smoking status: Heavy Smoker    Smokeless tobacco: Former   Substance and Sexual Activity    Alcohol use: No    Drug use: No    Sexual activity: Not on file   Other Topics Concern    Not on file   Social History Narrative    50year old AA female admitted on TDO for walking out of her paid apt. And stating she wants to be homeless because \"people have been following me and tazing me. \" Pt has severe financial problems and this has been a huge stressor. She is not on disability but is inn subsidized housing. [facility] but has not always complied due to transportation problem. Social Determinants of Health     Financial Resource Strain: Not on file   Food Insecurity: Not on file   Transportation Needs: Not on file   Physical Activity: Not on file   Stress: Not on file   Social Connections: Not on file   Intimate Partner Violence: Not on file   Housing Stability: Not on file        GENERAL ROS:  A comprehensive review of systems was negative except for that written in the HPI.     Visit Vitals  /80   Pulse 90   Resp 16   Ht 6' (1.829 m)   Wt 111.1 kg (245 lb)   SpO2 100%   BMI 33.23 kg/m²     Vitals:    01/06/23 1426   BP: 130/80   Pulse: 90   Resp: 16 SpO2: 100%   Weight: 111.1 kg (245 lb)   Height: 6' (1.829 m)        Wt Readings from Last 3 Encounters:   01/06/23 111.1 kg (245 lb)   01/28/18 88.2 kg (194 lb 8 oz)   01/15/18 90.7 kg (200 lb)            BP Readings from Last 3 Encounters:   01/06/23 130/80   01/29/18 117/73   01/15/18 132/67       PHYSICAL EXAM  General appearance: alert, cooperative, no distress, appears stated age  Neck: supple, symmetrical, trachea midline, no adenopathy, thyroid: not enlarged, symmetric, no tenderness/mass/nodules, no carotid bruit, and no JVD  Lungs: clear to auscultation bilaterally  Heart: regular rate and rhythm, S1, S2 normal, no murmur, click, rub or gallop  Extremities: 1+ LE edema    Lab Results   Component Value Date/Time    Cholesterol, total 221 (H) 01/23/2018 05:58 AM    HDL Cholesterol 43 01/23/2018 05:58 AM    LDL, calculated 151.2 (H) 01/23/2018 05:58 AM    Triglyceride 134 01/23/2018 05:58 AM    CHOL/HDL Ratio 5.1 (H) 01/23/2018 05:58 AM       Lab Results   Component Value Date/Time    WBC 7.1 01/29/2018 11:08 AM    HGB 11.8 01/29/2018 11:08 AM    HCT 36.6 01/29/2018 11:08 AM    PLATELET 939 99/78/7084 11:08 AM    MCV 88.0 01/29/2018 11:08 AM        Lab Results   Component Value Date/Time    Cholesterol, total 221 (H) 01/23/2018 05:58 AM    HDL Cholesterol 43 01/23/2018 05:58 AM    LDL, calculated 151.2 (H) 01/23/2018 05:58 AM    Triglyceride 134 01/23/2018 05:58 AM    CHOL/HDL Ratio 5.1 (H) 01/23/2018 05:58 AM        ASSESSMENT  Diagnoses and all orders for this visit:    1. Hypertension, unspecified type  -     AMB POC EKG ROUTINE W/ 12 LEADS, INTER & REP    2. Bipolar 1 disorder (HCC)    3. Edema, unspecified type       Encounter Diagnoses   Name Primary?     Hypertension, unspecified type Yes    Bipolar 1 disorder ([facility]     Edema, unspecified type      Orders Placed This Encounter    AMB POC EKG ROUTINE W/ 12 LEADS, INTER & REP    traZODone (DESYREL) 150 mg tablet    DISCONTD: lisinopriL (PRINIVIL, ZESTRIL) 30 mg tablet    hydroCHLOROthiazide (HYDRODIURIL) 25 mg tablet    atorvastatin (LIPITOR) 40 mg tablet    amLODIPine (NORVASC) 10 mg tablet    docusate sodium (COLACE) 100 mg capsule       Plan          Siddharth Du MD  1/8/2023        [facility]  [address] [facility] [address]  [address]  [phone] (F)    [address]  [address]  [phone] (P)  [phone] (F)    ATTENTION:   This medical record was transcribed using an electronic medical records/speech recognition system. Although proofread, it may and can contain electronic, spelling and other errors. Corrections may be executed at a later time. Please feel free to contact us for any clarifications as needed.

## 2023-01-08 PROBLEM — R60.9 EDEMA: Status: ACTIVE | Noted: 2023-01-08

## 2023-01-08 PROBLEM — I10 HYPERTENSION: Status: ACTIVE | Noted: 2023-01-08

## 2023-06-06 NOTE — ED NOTES
Patient (s)   given copy of dc instructions and   script(s). Patient(s)   verbalized understanding of instructions and script (s). Patient given a current medication reconciliation form and verbalized understanding of their medications. Patient (s)  verbalized understanding of the importance of discussing medications with  his or her physician or clinic when they follow up. Patient alert and oriented and in no acute distress. Pt verbalizes pain scale of 0 out of 10. Patient discharged home ambulatory with self. EL Murphy

## 2024-07-30 ENCOUNTER — HOSPITAL ENCOUNTER (OUTPATIENT)
Facility: HOSPITAL | Age: 59
Discharge: HOME | End: 2024-08-02
Payer: MEDICARE

## 2024-07-30 VITALS — HEIGHT: 72 IN | BODY MASS INDEX: 33.86 KG/M2 | WEIGHT: 250 LBS

## 2024-07-30 DIAGNOSIS — Z12.31 BREAST CANCER SCREENING BY MAMMOGRAM: ICD-10-CM

## 2024-07-30 PROCEDURE — 77063 BREAST TOMOSYNTHESIS BI: CPT
